# Patient Record
Sex: FEMALE | Race: NATIVE HAWAIIAN OR OTHER PACIFIC ISLANDER | NOT HISPANIC OR LATINO
[De-identification: names, ages, dates, MRNs, and addresses within clinical notes are randomized per-mention and may not be internally consistent; named-entity substitution may affect disease eponyms.]

---

## 2017-12-26 PROBLEM — Z00.00 ENCOUNTER FOR PREVENTIVE HEALTH EXAMINATION: Status: ACTIVE | Noted: 2017-12-26

## 2018-01-18 ENCOUNTER — APPOINTMENT (OUTPATIENT)
Dept: OPHTHALMOLOGY | Facility: CLINIC | Age: 73
End: 2018-01-18
Payer: MEDICARE

## 2018-01-18 PROCEDURE — 92014 COMPRE OPH EXAM EST PT 1/>: CPT

## 2018-02-23 ENCOUNTER — APPOINTMENT (OUTPATIENT)
Dept: OPHTHALMOLOGY | Facility: CLINIC | Age: 73
End: 2018-02-23
Payer: MEDICARE

## 2018-02-23 PROCEDURE — 92012 INTRM OPH EXAM EST PATIENT: CPT

## 2018-03-23 ENCOUNTER — APPOINTMENT (OUTPATIENT)
Dept: OPHTHALMOLOGY | Facility: CLINIC | Age: 73
End: 2018-03-23
Payer: MEDICARE

## 2018-03-23 PROCEDURE — 92012 INTRM OPH EXAM EST PATIENT: CPT

## 2018-03-23 PROCEDURE — 92133 CPTRZD OPH DX IMG PST SGM ON: CPT

## 2018-07-09 ENCOUNTER — APPOINTMENT (OUTPATIENT)
Dept: OPHTHALMOLOGY | Facility: CLINIC | Age: 73
End: 2018-07-09
Payer: MEDICARE

## 2018-07-09 PROCEDURE — 92082 INTERMEDIATE VISUAL FIELD XM: CPT

## 2018-07-09 PROCEDURE — 76514 ECHO EXAM OF EYE THICKNESS: CPT

## 2018-11-05 ENCOUNTER — APPOINTMENT (OUTPATIENT)
Dept: OPHTHALMOLOGY | Facility: CLINIC | Age: 73
End: 2018-11-05
Payer: MEDICARE

## 2018-11-05 DIAGNOSIS — H43.393 OTHER VITREOUS OPACITIES, BILATERAL: ICD-10-CM

## 2018-11-05 DIAGNOSIS — H53.19 OTHER SUBJECTIVE VISUAL DISTURBANCES: ICD-10-CM

## 2018-11-05 PROCEDURE — 92014 COMPRE OPH EXAM EST PT 1/>: CPT

## 2018-11-05 PROCEDURE — 92133 CPTRZD OPH DX IMG PST SGM ON: CPT

## 2019-03-04 ENCOUNTER — APPOINTMENT (OUTPATIENT)
Dept: OPHTHALMOLOGY | Facility: CLINIC | Age: 74
End: 2019-03-04
Payer: MEDICARE

## 2019-03-04 DIAGNOSIS — Z96.1 CATARACT EXTRACTION STATUS, RIGHT EYE: ICD-10-CM

## 2019-03-04 DIAGNOSIS — Z98.41 CATARACT EXTRACTION STATUS, RIGHT EYE: ICD-10-CM

## 2019-03-04 DIAGNOSIS — Z96.1 CATARACT EXTRACTION STATUS, LEFT EYE: ICD-10-CM

## 2019-03-04 DIAGNOSIS — Z96.1 PRESENCE OF INTRAOCULAR LENS: ICD-10-CM

## 2019-03-04 DIAGNOSIS — H40.029: ICD-10-CM

## 2019-03-04 DIAGNOSIS — Z98.42 CATARACT EXTRACTION STATUS, LEFT EYE: ICD-10-CM

## 2019-03-04 PROCEDURE — 92083 EXTENDED VISUAL FIELD XM: CPT

## 2019-03-04 PROCEDURE — 92014 COMPRE OPH EXAM EST PT 1/>: CPT

## 2019-05-21 ENCOUNTER — INPATIENT (INPATIENT)
Facility: HOSPITAL | Age: 74
LOS: 8 days | Discharge: NURSING, REHAB OR LONG-TERM CARE | DRG: 481 | End: 2019-05-30
Attending: ORTHOPAEDIC SURGERY | Admitting: ORTHOPAEDIC SURGERY
Payer: COMMERCIAL

## 2019-05-21 VITALS
HEIGHT: 67 IN | RESPIRATION RATE: 18 BRPM | HEART RATE: 78 BPM | SYSTOLIC BLOOD PRESSURE: 137 MMHG | DIASTOLIC BLOOD PRESSURE: 78 MMHG | OXYGEN SATURATION: 100 % | TEMPERATURE: 98 F | WEIGHT: 175.05 LBS

## 2019-05-21 LAB
ALBUMIN SERPL ELPH-MCNC: 3.7 G/DL — SIGNIFICANT CHANGE UP (ref 3.3–5)
ALP SERPL-CCNC: 74 U/L — SIGNIFICANT CHANGE UP (ref 40–120)
ALT FLD-CCNC: 20 U/L — SIGNIFICANT CHANGE UP (ref 10–45)
ANION GAP SERPL CALC-SCNC: 15 MMOL/L — SIGNIFICANT CHANGE UP (ref 5–17)
APTT BLD: 23.8 SEC — LOW (ref 27.5–36.3)
AST SERPL-CCNC: 28 U/L — SIGNIFICANT CHANGE UP (ref 10–40)
BASOPHILS # BLD AUTO: 0.04 K/UL — SIGNIFICANT CHANGE UP (ref 0–0.2)
BASOPHILS NFR BLD AUTO: 0.2 % — SIGNIFICANT CHANGE UP (ref 0–2)
BILIRUB SERPL-MCNC: 0.8 MG/DL — SIGNIFICANT CHANGE UP (ref 0.2–1.2)
BUN SERPL-MCNC: 18 MG/DL — SIGNIFICANT CHANGE UP (ref 7–23)
CALCIUM SERPL-MCNC: 9 MG/DL — SIGNIFICANT CHANGE UP (ref 8.4–10.5)
CHLORIDE SERPL-SCNC: 100 MMOL/L — SIGNIFICANT CHANGE UP (ref 96–108)
CO2 SERPL-SCNC: 24 MMOL/L — SIGNIFICANT CHANGE UP (ref 22–31)
CREAT SERPL-MCNC: 0.91 MG/DL — SIGNIFICANT CHANGE UP (ref 0.5–1.3)
EOSINOPHIL # BLD AUTO: 0.06 K/UL — SIGNIFICANT CHANGE UP (ref 0–0.5)
EOSINOPHIL NFR BLD AUTO: 0.4 % — SIGNIFICANT CHANGE UP (ref 0–6)
GLUCOSE SERPL-MCNC: 142 MG/DL — HIGH (ref 70–99)
HCT VFR BLD CALC: 38.5 % — SIGNIFICANT CHANGE UP (ref 34.5–45)
HGB BLD-MCNC: 12.9 G/DL — SIGNIFICANT CHANGE UP (ref 11.5–15.5)
IMM GRANULOCYTES NFR BLD AUTO: 0.5 % — SIGNIFICANT CHANGE UP (ref 0–1.5)
INR BLD: 1.06 — SIGNIFICANT CHANGE UP (ref 0.88–1.16)
LYMPHOCYTES # BLD AUTO: 1.27 K/UL — SIGNIFICANT CHANGE UP (ref 1–3.3)
LYMPHOCYTES # BLD AUTO: 7.4 % — LOW (ref 13–44)
MCHC RBC-ENTMCNC: 31.2 PG — SIGNIFICANT CHANGE UP (ref 27–34)
MCHC RBC-ENTMCNC: 33.5 GM/DL — SIGNIFICANT CHANGE UP (ref 32–36)
MCV RBC AUTO: 93 FL — SIGNIFICANT CHANGE UP (ref 80–100)
MONOCYTES # BLD AUTO: 0.84 K/UL — SIGNIFICANT CHANGE UP (ref 0–0.9)
MONOCYTES NFR BLD AUTO: 4.9 % — SIGNIFICANT CHANGE UP (ref 2–14)
NEUTROPHILS # BLD AUTO: 14.79 K/UL — HIGH (ref 1.8–7.4)
NEUTROPHILS NFR BLD AUTO: 86.6 % — HIGH (ref 43–77)
NRBC # BLD: 0 /100 WBCS — SIGNIFICANT CHANGE UP (ref 0–0)
PLATELET # BLD AUTO: 228 K/UL — SIGNIFICANT CHANGE UP (ref 150–400)
POTASSIUM SERPL-MCNC: SIGNIFICANT CHANGE UP MMOL/L (ref 3.5–5.3)
POTASSIUM SERPL-SCNC: SIGNIFICANT CHANGE UP MMOL/L (ref 3.5–5.3)
PROT SERPL-MCNC: 6.5 G/DL — SIGNIFICANT CHANGE UP (ref 6–8.3)
PROTHROM AB SERPL-ACNC: 12 SEC — SIGNIFICANT CHANGE UP (ref 10–12.9)
RBC # BLD: 4.14 M/UL — SIGNIFICANT CHANGE UP (ref 3.8–5.2)
RBC # FLD: 12.8 % — SIGNIFICANT CHANGE UP (ref 10.3–14.5)
SODIUM SERPL-SCNC: 139 MMOL/L — SIGNIFICANT CHANGE UP (ref 135–145)
WBC # BLD: 17.08 K/UL — HIGH (ref 3.8–10.5)
WBC # FLD AUTO: 17.08 K/UL — HIGH (ref 3.8–10.5)

## 2019-05-21 PROCEDURE — 93010 ELECTROCARDIOGRAM REPORT: CPT

## 2019-05-21 PROCEDURE — 73552 X-RAY EXAM OF FEMUR 2/>: CPT | Mod: 26,RT

## 2019-05-21 PROCEDURE — 71045 X-RAY EXAM CHEST 1 VIEW: CPT | Mod: 26

## 2019-05-21 PROCEDURE — 99285 EMERGENCY DEPT VISIT HI MDM: CPT | Mod: 25

## 2019-05-21 PROCEDURE — 72192 CT PELVIS W/O DYE: CPT | Mod: 26

## 2019-05-21 PROCEDURE — 73502 X-RAY EXAM HIP UNI 2-3 VIEWS: CPT | Mod: 26,RT

## 2019-05-21 RX ORDER — ASPIRIN/CALCIUM CARB/MAGNESIUM 324 MG
81 TABLET ORAL DAILY
Refills: 0 | Status: DISCONTINUED | OUTPATIENT
Start: 2019-05-21 | End: 2019-05-22

## 2019-05-21 RX ORDER — IBUPROFEN 200 MG
600 TABLET ORAL ONCE
Refills: 0 | Status: COMPLETED | OUTPATIENT
Start: 2019-05-21 | End: 2019-05-21

## 2019-05-21 RX ORDER — TRAZODONE HCL 50 MG
100 TABLET ORAL DAILY
Refills: 0 | Status: DISCONTINUED | OUTPATIENT
Start: 2019-05-21 | End: 2019-05-30

## 2019-05-21 RX ORDER — TRIAMTERENE/HYDROCHLOROTHIAZID 75 MG-50MG
1 TABLET ORAL DAILY
Refills: 0 | Status: DISCONTINUED | OUTPATIENT
Start: 2019-05-21 | End: 2019-05-23

## 2019-05-21 RX ORDER — ATORVASTATIN CALCIUM 80 MG/1
20 TABLET, FILM COATED ORAL AT BEDTIME
Refills: 0 | Status: DISCONTINUED | OUTPATIENT
Start: 2019-05-21 | End: 2019-05-30

## 2019-05-21 RX ORDER — LATANOPROST 0.05 MG/ML
1 SOLUTION/ DROPS OPHTHALMIC; TOPICAL AT BEDTIME
Refills: 0 | Status: DISCONTINUED | OUTPATIENT
Start: 2019-05-21 | End: 2019-05-30

## 2019-05-21 RX ORDER — LISINOPRIL 2.5 MG/1
40 TABLET ORAL DAILY
Refills: 0 | Status: DISCONTINUED | OUTPATIENT
Start: 2019-05-21 | End: 2019-05-30

## 2019-05-21 RX ADMIN — Medication 100 MILLIGRAM(S): at 23:36

## 2019-05-21 RX ADMIN — Medication 600 MILLIGRAM(S): at 19:58
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
## 2019-05-21 NOTE — ED PROVIDER NOTE - PHYSICAL EXAMINATION
RLE - shortened, unable to range R leg d/t pain, 2+DP. +ecchymosis to R medial lower leg  R groin pain

## 2019-05-21 NOTE — H&P ADULT - NSHPPHYSICALEXAM_GEN_ALL_CORE
Vital Signs Last 24 Hrs  T(C): 36.6 (21 May 2019 16:52), Max: 36.6 (21 May 2019 16:52)  T(F): 97.8 (21 May 2019 16:52), Max: 97.8 (21 May 2019 16:52)  HR: 78 (21 May 2019 16:52) (78 - 78)  BP: 137/78 (21 May 2019 16:52) (137/78 - 137/78)  BP(mean): --  RR: 18 (21 May 2019 16:52) (18 - 18)  SpO2: 100% (21 May 2019 16:52) (100% - 100%)    VSS  General: Pleasant appearing elderly female; NAD; A&Ox3  RLE: Extremity shortened and externally rotated; No skin breaks over the extremity; Significant TTP over the groin area; Extremity WWP, DP/PT pulses 2+cap refill <2 sec; SILT throughout the extremity; 5/5 power TA/GS/EHL/FHL
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
## 2019-05-21 NOTE — H&P ADULT - NSHPLABSRESULTS_GEN_ALL_CORE
Labs:                        12.9   17.08 )-----------( 228      ( 21 May 2019 21:25 )             38.5       05-21    139  |  100  |  18  ----------------------------<  142<H>  SEE NOTE   |  24  |  0.91    Ca    9.0      21 May 2019 21:25    TPro  6.5  /  Alb  3.7  /  TBili  0.8  /  DBili  x   /  AST  28  /  ALT  20  /  AlkPhos  74  05-21          PT/INR - ( 21 May 2019 21:25 )   PT: 12.0 sec;   INR: 1.06          PTT - ( 21 May 2019 21:25 )  PTT:23.8 sec    Pelvis CT shows severely shortened R femur intertroch fx  R hip/R femur XRs pending
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
## 2019-05-21 NOTE — H&P ADULT - NSHPSOCIALHISTORY_GEN_ALL_CORE
Lives alone  3 steps to get to apartment door  Independent in ADLs  Walks on average 5000 steps/day  Drinks 3-4 days/week w 1-2 drinks at a time  Non-smoker
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
## 2019-05-21 NOTE — ED ADULT NURSE NOTE - CHPI ED NUR SYMPTOMS NEG
no fever/no back pain/no tingling/no weakness/no bruising/no numbness/no stiffness/no abrasion/no deformity
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
## 2019-05-22 DIAGNOSIS — S72.001A FRACTURE OF UNSPECIFIED PART OF NECK OF RIGHT FEMUR, INITIAL ENCOUNTER FOR CLOSED FRACTURE: ICD-10-CM

## 2019-05-22 DIAGNOSIS — I10 ESSENTIAL (PRIMARY) HYPERTENSION: ICD-10-CM
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
## 2019-05-22 DIAGNOSIS — E78.00 PURE HYPERCHOLESTEROLEMIA, UNSPECIFIED: ICD-10-CM

## 2019-05-22 DIAGNOSIS — Z01.818 ENCOUNTER FOR OTHER PREPROCEDURAL EXAMINATION: ICD-10-CM

## 2019-05-22 DIAGNOSIS — D72.828 OTHER ELEVATED WHITE BLOOD CELL COUNT: ICD-10-CM

## 2019-05-22 LAB
ANION GAP SERPL CALC-SCNC: 10 MMOL/L — SIGNIFICANT CHANGE UP (ref 5–17)
APPEARANCE UR: CLEAR — SIGNIFICANT CHANGE UP
BILIRUB UR-MCNC: NEGATIVE — SIGNIFICANT CHANGE UP
BLD GP AB SCN SERPL QL: NEGATIVE — SIGNIFICANT CHANGE UP
BLD GP AB SCN SERPL QL: NEGATIVE — SIGNIFICANT CHANGE UP
BUN SERPL-MCNC: 22 MG/DL — SIGNIFICANT CHANGE UP (ref 7–23)
CALCIUM SERPL-MCNC: 8.4 MG/DL — SIGNIFICANT CHANGE UP (ref 8.4–10.5)
CHLORIDE SERPL-SCNC: 103 MMOL/L — SIGNIFICANT CHANGE UP (ref 96–108)
CO2 SERPL-SCNC: 28 MMOL/L — SIGNIFICANT CHANGE UP (ref 22–31)
COLOR SPEC: YELLOW — SIGNIFICANT CHANGE UP
CREAT SERPL-MCNC: 1.07 MG/DL — SIGNIFICANT CHANGE UP (ref 0.5–1.3)
DIFF PNL FLD: NEGATIVE — SIGNIFICANT CHANGE UP
GLUCOSE SERPL-MCNC: 127 MG/DL — HIGH (ref 70–99)
GLUCOSE UR QL: NEGATIVE — SIGNIFICANT CHANGE UP
HCT VFR BLD CALC: 35 % — SIGNIFICANT CHANGE UP (ref 34.5–45)
HGB BLD-MCNC: 11.4 G/DL — LOW (ref 11.5–15.5)
KETONES UR-MCNC: ABNORMAL MG/DL
LEUKOCYTE ESTERASE UR-ACNC: NEGATIVE — SIGNIFICANT CHANGE UP
MCHC RBC-ENTMCNC: 30.7 PG — SIGNIFICANT CHANGE UP (ref 27–34)
MCHC RBC-ENTMCNC: 32.6 GM/DL — SIGNIFICANT CHANGE UP (ref 32–36)
MCV RBC AUTO: 94.3 FL — SIGNIFICANT CHANGE UP (ref 80–100)
MRSA PCR RESULT.: NEGATIVE — SIGNIFICANT CHANGE UP
NITRITE UR-MCNC: NEGATIVE — SIGNIFICANT CHANGE UP
NRBC # BLD: 0 /100 WBCS — SIGNIFICANT CHANGE UP (ref 0–0)
PH UR: 6.5 — SIGNIFICANT CHANGE UP (ref 5–8)
PLATELET # BLD AUTO: 198 K/UL — SIGNIFICANT CHANGE UP (ref 150–400)
POTASSIUM SERPL-MCNC: 3.6 MMOL/L — SIGNIFICANT CHANGE UP (ref 3.5–5.3)
POTASSIUM SERPL-SCNC: 3.6 MMOL/L — SIGNIFICANT CHANGE UP (ref 3.5–5.3)
PROT UR-MCNC: NEGATIVE MG/DL — SIGNIFICANT CHANGE UP
RBC # BLD: 3.71 M/UL — LOW (ref 3.8–5.2)
RBC # FLD: 12.9 % — SIGNIFICANT CHANGE UP (ref 10.3–14.5)
RH IG SCN BLD-IMP: POSITIVE — SIGNIFICANT CHANGE UP
RH IG SCN BLD-IMP: POSITIVE — SIGNIFICANT CHANGE UP
S AUREUS DNA NOSE QL NAA+PROBE: NEGATIVE — SIGNIFICANT CHANGE UP
SODIUM SERPL-SCNC: 141 MMOL/L — SIGNIFICANT CHANGE UP (ref 135–145)
SP GR SPEC: 1.02 — SIGNIFICANT CHANGE UP (ref 1–1.03)
UROBILINOGEN FLD QL: 1 E.U./DL — SIGNIFICANT CHANGE UP
WBC # BLD: 9.59 K/UL — SIGNIFICANT CHANGE UP (ref 3.8–10.5)
WBC # FLD AUTO: 9.59 K/UL — SIGNIFICANT CHANGE UP (ref 3.8–10.5)

## 2019-05-22 PROCEDURE — 99223 1ST HOSP IP/OBS HIGH 75: CPT | Mod: GC

## 2019-05-22 RX ORDER — ACETAMINOPHEN 500 MG
650 TABLET ORAL EVERY 6 HOURS
Refills: 0 | Status: DISCONTINUED | OUTPATIENT
Start: 2019-05-22 | End: 2019-05-23

## 2019-05-22 RX ORDER — TRAMADOL HYDROCHLORIDE 50 MG/1
25 TABLET ORAL EVERY 4 HOURS
Refills: 0 | Status: DISCONTINUED | OUTPATIENT
Start: 2019-05-22 | End: 2019-05-22

## 2019-05-22 RX ORDER — OXYCODONE HYDROCHLORIDE 5 MG/1
10 TABLET ORAL EVERY 4 HOURS
Refills: 0 | Status: DISCONTINUED | OUTPATIENT
Start: 2019-05-23 | End: 2019-05-29

## 2019-05-22 RX ORDER — FERROUS SULFATE 325(65) MG
325 TABLET ORAL
Refills: 0 | Status: DISCONTINUED | OUTPATIENT
Start: 2019-05-23 | End: 2019-05-30

## 2019-05-22 RX ORDER — ASPIRIN/CALCIUM CARB/MAGNESIUM 324 MG
325 TABLET ORAL
Refills: 0 | Status: DISCONTINUED | OUTPATIENT
Start: 2019-05-23 | End: 2019-05-30

## 2019-05-22 RX ORDER — ASCORBIC ACID 60 MG
500 TABLET,CHEWABLE ORAL
Refills: 0 | Status: DISCONTINUED | OUTPATIENT
Start: 2019-05-23 | End: 2019-05-30

## 2019-05-22 RX ORDER — ONDANSETRON 8 MG/1
4 TABLET, FILM COATED ORAL EVERY 6 HOURS
Refills: 0 | Status: DISCONTINUED | OUTPATIENT
Start: 2019-05-23 | End: 2019-05-30

## 2019-05-22 RX ORDER — MORPHINE SULFATE 50 MG/1
2 CAPSULE, EXTENDED RELEASE ORAL EVERY 4 HOURS
Refills: 0 | Status: DISCONTINUED | OUTPATIENT
Start: 2019-05-23 | End: 2019-05-23

## 2019-05-22 RX ORDER — TRAMADOL HYDROCHLORIDE 50 MG/1
50 TABLET ORAL EVERY 4 HOURS
Refills: 0 | Status: DISCONTINUED | OUTPATIENT
Start: 2019-05-22 | End: 2019-05-29

## 2019-05-22 RX ORDER — DOCUSATE SODIUM 100 MG
100 CAPSULE ORAL THREE TIMES A DAY
Refills: 0 | Status: DISCONTINUED | OUTPATIENT
Start: 2019-05-23 | End: 2019-05-30

## 2019-05-22 RX ORDER — SODIUM CHLORIDE 9 MG/ML
1000 INJECTION, SOLUTION INTRAVENOUS
Refills: 0 | Status: DISCONTINUED | OUTPATIENT
Start: 2019-05-22 | End: 2019-05-23

## 2019-05-22 RX ORDER — FOLIC ACID 0.8 MG
1 TABLET ORAL DAILY
Refills: 0 | Status: DISCONTINUED | OUTPATIENT
Start: 2019-05-23 | End: 2019-05-30

## 2019-05-22 RX ADMIN — TRAMADOL HYDROCHLORIDE 50 MILLIGRAM(S): 50 TABLET ORAL at 19:20

## 2019-05-22 RX ADMIN — TRAMADOL HYDROCHLORIDE 50 MILLIGRAM(S): 50 TABLET ORAL at 18:27

## 2019-05-22 RX ADMIN — TRAMADOL HYDROCHLORIDE 25 MILLIGRAM(S): 50 TABLET ORAL at 00:44

## 2019-05-22 NOTE — PROGRESS NOTE ADULT - SUBJECTIVE AND OBJECTIVE BOX
ORTHO NOTE    [x ] Pt seen/examined at 9am  [ ] Pt without any complaints/in NAD.    [x ] Pt complains of: right hip pain with movement      ROS: [ ] Fever  [ ] Chills  [ ] CP [ ] SOB [ ] Dysnea  [ ] Palpitations [ ] Cough [ ] N/V/C/D [ ] Paresthia [ ] Other     [x ] ROS  otherwise negative    .    PHYSICAL EXAM:    Vital Signs Last 24 Hrs  T(C): 36.3 (22 May 2019 08:42), Max: 36.7 (21 May 2019 23:30)  T(F): 97.3 (22 May 2019 08:42), Max: 98 (21 May 2019 23:30)  HR: 78 (22 May 2019 08:42) (70 - 78)  BP: 101/60 (22 May 2019 08:42) (101/60 - 137/78)  BP(mean): --  RR: 15 (22 May 2019 08:42) (15 - 18)  SpO2: 98% (22 May 2019 08:42) (95% - 100%)    I&O's Detail    22 May 2019 07:01  -  22 May 2019 15:23  --------------------------------------------------------  IN:  Total IN: 0 mL    OUT:    Indwelling Catheter - Urethral: 300 mL  Total OUT: 300 mL    Total NET: -300 mL           CAPILLARY BLOOD GLUCOSE                      Neuro: AAOX3    Lungs: nonlabored, Spo2 wnl on RA    CV:    ABD: soft, nontender    Ext:  right lower extremity skin cdi with no noted ecchymosis or abrasions  right LE shortened and externally rotated compared with contralateral extremity  right LE sensation: SILT  right LE vascular: pedal pulse 2+, warm  right LE motor: wiggling toes, unable to perform further mobility secondary to pain    LABS                        11.4   9.59  )-----------( 198      ( 22 May 2019 06:27 )             35.0                              PT/INR - ( 21 May 2019 21:25 )   PT: 12.0 sec;   INR: 1.06          PTT - ( 21 May 2019 21:25 )  PTT:23.8 sec  05-22    141  |  103  |  22  ----------------------------<  127<H>  3.6   |  28  |  1.07    Ca    8.4      22 May 2019 06:27    TPro  6.5  /  Alb  3.7  /  TBili  0.8  /  DBili  x   /  AST  28  /  ALT  20  /  AlkPhos  74  05-21      [ ] Other Labs  [ ] None ordered            Please check or [place] when present:  •  Heart Failure:    [ ] Acute        [ ]  Acute on Chronic        [ ] Chronic         [ ] Diastolic     [ ]  Combined    •  RIAZ:     [ ] ATN        [ ]  Renal medullary necrosis       [ ]  Renal cortical necrosis                  [ ] Other pathological Lesion:  •  CKD:  [ ] Stage I   [ ] Stage II  [ ] Stage III    [ ]Stage IV   [ ]  CKD V   [ ]  Other/Unspecified:    •  Abdominal Nutritional Status:   [ ] Malnutrition-See Nutrition note    [ ] Cachexia   [ ]  Other        [ ] Supplement ordered:            [ ] Morbid Obesity: BMI >=40         ASSESSMENT/PLAN:      STATUS POST: right IT fracture for OR on 5/22/19 for right femur IMN  medically cleared by Dr. Nicolas  NPO on LR  IS demonstrated  PCP Dr. Betsy Matos to be notified of injury  continue urinary pizarro for immobilization  CONTINUE:          [ ] PT- bedrest    [ ] DVT PPX- scd    [ ] Pain Mgt- po meds    [ ] Dispo plan- pending PT evaluation

## 2019-05-22 NOTE — CONSULT NOTE ADULT - PROBLEM SELECTOR RECOMMENDATION 5
The patient's medical condition is optimized for surgery.  There is no contraindication for surgery.  There is no clinical evidence neither of angina, decompensated CHF, arrhthymias, nor valvular disease.   There is no limitation of exercise capacity.  MET is 5 .  ASA class is 2.  [place] cardiac risk factor is low .  DVT prophylaxis is indicated.  Pain control.  Early mobilization.  Avoid fluid overload.

## 2019-05-22 NOTE — PROGRESS NOTE ADULT - SUBJECTIVE AND OBJECTIVE BOX
Ortho Note    Pt comfortable without complaints, pain controlled  Denies CP, SOB, N/V, numbness/tingling     Vital Signs Last 24 Hrs  T(C): 36.3 (22 May 2019 08:42), Max: 36.7 (21 May 2019 23:30)  T(F): 97.3 (22 May 2019 08:42), Max: 98 (21 May 2019 23:30)  HR: 78 (22 May 2019 08:42) (70 - 78)  BP: 101/60 (22 May 2019 08:42) (101/60 - 137/78)  BP(mean): --  RR: 15 (22 May 2019 08:42) (15 - 18)  SpO2: 98% (22 May 2019 08:42) (95% - 100%)    VSS  General: A&Ox3, NAD  RLE: Extremity shortened and externally rotated; No skin breaks over the extremity; Significant TTP over the groin area; Extremity WWP, DP/PT pulses 2+cap refill <2 sec; SILT throughout the extremity; 5/5 power TA/GS/EHL/FHL                          11.4   9.59  )-----------( 198      ( 22 May 2019 06:27 )             35.0   22 May 2019 06:27    141    |  103    |  22     ----------------------------<  127    3.6     |  28     |  1.07     Ca    8.4        22 May 2019 06:27    TPro  6.5    /  Alb  3.7    /  TBili  0.8    /  DBili  x      /  AST  28     /  ALT  20     /  AlkPhos  74     21 May 2019 21:25
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
## 2019-05-23 ENCOUNTER — TRANSCRIPTION ENCOUNTER (OUTPATIENT)
Age: 74
End: 2019-05-23

## 2019-05-23 DIAGNOSIS — Z98.890 OTHER SPECIFIED POSTPROCEDURAL STATES: ICD-10-CM

## 2019-05-23 DIAGNOSIS — C50.912 MALIGNANT NEOPLASM OF UNSPECIFIED SITE OF LEFT FEMALE BREAST: ICD-10-CM

## 2019-05-23 LAB
ANION GAP SERPL CALC-SCNC: 9 MMOL/L — SIGNIFICANT CHANGE UP (ref 5–17)
BUN SERPL-MCNC: 22 MG/DL — SIGNIFICANT CHANGE UP (ref 7–23)
CALCIUM SERPL-MCNC: 8.2 MG/DL — LOW (ref 8.4–10.5)
CHLORIDE SERPL-SCNC: 102 MMOL/L — SIGNIFICANT CHANGE UP (ref 96–108)
CO2 SERPL-SCNC: 27 MMOL/L — SIGNIFICANT CHANGE UP (ref 22–31)
CREAT SERPL-MCNC: 1.05 MG/DL — SIGNIFICANT CHANGE UP (ref 0.5–1.3)
GLUCOSE SERPL-MCNC: 176 MG/DL — HIGH (ref 70–99)
HCT VFR BLD CALC: 25 % — LOW (ref 34.5–45)
HGB BLD-MCNC: 8.4 G/DL — LOW (ref 11.5–15.5)
MCHC RBC-ENTMCNC: 31.8 PG — SIGNIFICANT CHANGE UP (ref 27–34)
MCHC RBC-ENTMCNC: 33.6 GM/DL — SIGNIFICANT CHANGE UP (ref 32–36)
MCV RBC AUTO: 94.7 FL — SIGNIFICANT CHANGE UP (ref 80–100)
NRBC # BLD: 0 /100 WBCS — SIGNIFICANT CHANGE UP (ref 0–0)
PLATELET # BLD AUTO: 188 K/UL — SIGNIFICANT CHANGE UP (ref 150–400)
POTASSIUM SERPL-MCNC: 4.3 MMOL/L — SIGNIFICANT CHANGE UP (ref 3.5–5.3)
POTASSIUM SERPL-SCNC: 4.3 MMOL/L — SIGNIFICANT CHANGE UP (ref 3.5–5.3)
RBC # BLD: 2.64 M/UL — LOW (ref 3.8–5.2)
RBC # FLD: 12.8 % — SIGNIFICANT CHANGE UP (ref 10.3–14.5)
SODIUM SERPL-SCNC: 138 MMOL/L — SIGNIFICANT CHANGE UP (ref 135–145)
WBC # BLD: 15.7 K/UL — HIGH (ref 3.8–10.5)
WBC # FLD AUTO: 15.7 K/UL — HIGH (ref 3.8–10.5)

## 2019-05-23 PROCEDURE — 73552 X-RAY EXAM OF FEMUR 2/>: CPT | Mod: 26,RT

## 2019-05-23 PROCEDURE — 99232 SBSQ HOSP IP/OBS MODERATE 35: CPT | Mod: GC

## 2019-05-23 RX ORDER — ACETAMINOPHEN 500 MG
650 TABLET ORAL EVERY 6 HOURS
Refills: 0 | Status: DISCONTINUED | OUTPATIENT
Start: 2019-05-23 | End: 2019-05-23

## 2019-05-23 RX ORDER — ASPIRIN/CALCIUM CARB/MAGNESIUM 324 MG
1 TABLET ORAL
Qty: 0 | Refills: 0 | DISCHARGE
Start: 2019-05-23

## 2019-05-23 RX ORDER — MORPHINE SULFATE 50 MG/1
2 CAPSULE, EXTENDED RELEASE ORAL
Refills: 0 | Status: DISCONTINUED | OUTPATIENT
Start: 2019-05-23 | End: 2019-05-23

## 2019-05-23 RX ORDER — CEFAZOLIN SODIUM 1 G
2000 VIAL (EA) INJECTION EVERY 8 HOURS
Refills: 0 | Status: COMPLETED | OUTPATIENT
Start: 2019-05-23 | End: 2019-05-23

## 2019-05-23 RX ORDER — DOCUSATE SODIUM 100 MG
1 CAPSULE ORAL
Qty: 0 | Refills: 0 | DISCHARGE
Start: 2019-05-23

## 2019-05-23 RX ORDER — CEFAZOLIN SODIUM 1 G
2000 VIAL (EA) INJECTION EVERY 8 HOURS
Refills: 0 | Status: DISCONTINUED | OUTPATIENT
Start: 2019-05-23 | End: 2019-05-23

## 2019-05-23 RX ORDER — MAGNESIUM HYDROXIDE 400 MG/1
30 TABLET, CHEWABLE ORAL DAILY
Refills: 0 | Status: DISCONTINUED | OUTPATIENT
Start: 2019-05-23 | End: 2019-05-23

## 2019-05-23 RX ORDER — ACETAMINOPHEN 500 MG
975 TABLET ORAL EVERY 8 HOURS
Refills: 0 | Status: DISCONTINUED | OUTPATIENT
Start: 2019-05-23 | End: 2019-05-30

## 2019-05-23 RX ORDER — SODIUM CHLORIDE 9 MG/ML
1000 INJECTION, SOLUTION INTRAVENOUS
Refills: 0 | Status: DISCONTINUED | OUTPATIENT
Start: 2019-05-23 | End: 2019-05-23

## 2019-05-23 RX ADMIN — Medication 100 MILLIGRAM(S): at 13:43

## 2019-05-23 RX ADMIN — Medication 100 MILLIGRAM(S): at 05:27

## 2019-05-23 RX ADMIN — Medication 325 MILLIGRAM(S): at 17:47

## 2019-05-23 RX ADMIN — SODIUM CHLORIDE 120 MILLILITER(S): 9 INJECTION, SOLUTION INTRAVENOUS at 00:15

## 2019-05-23 RX ADMIN — ATORVASTATIN CALCIUM 20 MILLIGRAM(S): 80 TABLET, FILM COATED ORAL at 21:41

## 2019-05-23 RX ADMIN — Medication 325 MILLIGRAM(S): at 05:27

## 2019-05-23 RX ADMIN — Medication 2000 MILLIGRAM(S): at 04:33

## 2019-05-23 RX ADMIN — Medication 975 MILLIGRAM(S): at 21:41

## 2019-05-23 RX ADMIN — Medication 1 TABLET(S): at 13:43

## 2019-05-23 RX ADMIN — Medication 975 MILLIGRAM(S): at 22:41

## 2019-05-23 RX ADMIN — Medication 500 MILLIGRAM(S): at 05:27

## 2019-05-23 RX ADMIN — Medication 1 MILLIGRAM(S): at 13:43

## 2019-05-23 RX ADMIN — Medication 2000 MILLIGRAM(S): at 13:43

## 2019-05-23 RX ADMIN — Medication 975 MILLIGRAM(S): at 14:40

## 2019-05-23 RX ADMIN — Medication 100 MILLIGRAM(S): at 21:41

## 2019-05-23 RX ADMIN — Medication 975 MILLIGRAM(S): at 13:42

## 2019-05-23 RX ADMIN — Medication 325 MILLIGRAM(S): at 08:07

## 2019-05-23 RX ADMIN — Medication 500 MILLIGRAM(S): at 17:47

## 2019-05-23 RX ADMIN — LATANOPROST 1 DROP(S): 0.05 SOLUTION/ DROPS OPHTHALMIC; TOPICAL at 21:42

## 2019-05-23 NOTE — DISCHARGE NOTE PROVIDER - NSDCACTIVITY_GEN_ALL_CORE
Do not make important decisions/Do not drive or operate machinery/Showering allowed No heavy lifting/straining/Showering allowed/Do not drive or operate machinery/Walking - Indoors allowed/Stairs allowed
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
## 2019-05-23 NOTE — PHYSICAL THERAPY INITIAL EVALUATION ADULT - ADDITIONAL COMMENTS
Pt lives alone in an apartment, 3 steps walk up. Prior to admission, pt ambulated independently without assistive device. Pt has a cane at home.

## 2019-05-23 NOTE — OCCUPATIONAL THERAPY INITIAL EVALUATION ADULT - GENERAL OBSERVATIONS, REHAB EVAL
Right hand dominant. Chart reviewed, patient cleared for OT eval by KIRTI Rose. Received semi-supine, NAD, +SCDs.
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
## 2019-05-23 NOTE — PROGRESS NOTE ADULT - SUBJECTIVE AND OBJECTIVE BOX
ORTHO NOTE    [x ] Pt seen/examined at 10am  [ ] Pt without any complaints/in NAD.    [x ] Pt complains of: mild right femur pain with mobility tolerable on oral pain regimen      ROS: [ ] Fever  [ ] Chills  [ ] CP [ ] SOB [ ] Dysnea  [ ] Palpitations [ ] Cough [ ] N/V/C/D [ ] Paresthia [ ] Other     [x ] ROS  otherwise negative    .    PHYSICAL EXAM:    Vital Signs Last 24 Hrs  T(C): 36.3 (23 May 2019 08:40), Max: 36.4 (23 May 2019 04:53)  T(F): 97.3 (23 May 2019 08:40), Max: 97.6 (23 May 2019 04:53)  HR: 86 (23 May 2019 08:40) (80 - 104)  BP: 105/68 (23 May 2019 08:40) (100/66 - 155/71)  BP(mean): 66 (23 May 2019 01:45) (66 - 104)  RR: 15 (23 May 2019 08:40) (12 - 19)  SpO2: 98% (23 May 2019 08:40) (96% - 100%)    I&O's Detail    22 May 2019 07:01  -  23 May 2019 07:00  --------------------------------------------------------  IN:    lactated ringers.: 600 mL    lactated ringers.: 360 mL  Total IN: 960 mL    OUT:    Indwelling Catheter - Urethral: 755 mL  Total OUT: 755 mL    Total NET: 205 mL      23 May 2019 07:01  -  23 May 2019 14:44  --------------------------------------------------------  IN:    Oral Fluid: 240 mL  Total IN: 240 mL    OUT:  Total OUT: 0 mL    Total NET: 240 mL           CAPILLARY BLOOD GLUCOSE                      Neuro: AAOX3    Lungs: nonlabored, Spo2 wnl on RA, IS demonstrated    CV:    ABD: soft, nontender    Ext:  right femur aquacel dressing cdi  right LE sensation: silt  right LE vascular: warm, pedal pulse 2+  right LE motor: 5/5 ehl/fhl/gs/ta    LABS                        8.4    15.70 )-----------( 188      ( 23 May 2019 10:20 )             25.0                              PT/INR - ( 21 May 2019 21:25 )   PT: 12.0 sec;   INR: 1.06          PTT - ( 21 May 2019 21:25 )  PTT:23.8 sec  05-23    138  |  102  |  22  ----------------------------<  176<H>  4.3   |  27  |  1.05    Ca    8.2<L>      23 May 2019 10:20    TPro  6.5  /  Alb  3.7  /  TBili  0.8  /  DBili  x   /  AST  28  /  ALT  20  /  AlkPhos  74  05-21      [ ] Other Labs  [ ] None ordered            Please check or [place] when present:  •  Heart Failure:    [ ] Acute        [ ]  Acute on Chronic        [ ] Chronic         [ ] Diastolic     [ ]  Combined    •  RIAZ:     [ ] ATN        [ ]  Renal medullary necrosis       [ ]  Renal cortical necrosis                  [ ] Other pathological Lesion:  •  CKD:  [ ] Stage I   [ ] Stage II  [ ] Stage III    [ ]Stage IV   [ ]  CKD V   [ ]  Other/Unspecified:    •  Abdominal Nutritional Status:   [ ] Malnutrition-See Nutrition note    [ ] Cachexia   [ ]  Other        [ ] Supplement ordered:            [ ] Morbid Obesity: BMI >=40         ASSESSMENT/PLAN:      STATUS POST: pod1 right femur IM nailing  patient arrived to the 8th floor at approximately 2am.  IVF continued until approximately 1pm.  naz pizarro TOV  patient dangled on side of bed with min assistance  pain control- acetaminophen 975mg q 8 standing pain, oxycodone 5-10mg q 4 prn  bowel regimen  CONTINUE:          [ ] PT- wbat    [ ] DVT PPX- scd, ASA    [ ] Pain Mgt- po meds    [ ] Dispo plan- pending PT evaluation, lives alone likely [facility] ORTHO NOTE    [x ] Pt seen/examined at 10am  [ ] Pt without any complaints/in NAD.    [x ] Pt complains of: mild right femur pain with mobility tolerable on oral pain regimen      ROS: [ ] Fever  [ ] Chills  [ ] CP [ ] SOB [ ] Dysnea  [ ] Palpitations [ ] Cough [ ] N/V/C/D [ ] Paresthia [ ] Other     [x ] ROS  otherwise negative    .    PHYSICAL EXAM:    Vital Signs Last 24 Hrs  T(C): 36.3 (23 May 2019 08:40), Max: 36.4 (23 May 2019 04:53)  T(F): 97.3 (23 May 2019 08:40), Max: 97.6 (23 May 2019 04:53)  HR: 86 (23 May 2019 08:40) (80 - 104)  BP: 105/68 (23 May 2019 08:40) (100/66 - 155/71)  BP(mean): 66 (23 May 2019 01:45) (66 - 104)  RR: 15 (23 May 2019 08:40) (12 - 19)  SpO2: 98% (23 May 2019 08:40) (96% - 100%)    I&O's Detail    22 May 2019 07:01  -  23 May 2019 07:00  --------------------------------------------------------  IN:    lactated ringers.: 600 mL    lactated ringers.: 360 mL  Total IN: 960 mL    OUT:    Indwelling Catheter - Urethral: 755 mL  Total OUT: 755 mL    Total NET: 205 mL      23 May 2019 07:01  -  23 May 2019 14:44  --------------------------------------------------------  IN:    Oral Fluid: 240 mL  Total IN: 240 mL    OUT:  Total OUT: 0 mL    Total NET: 240 mL           CAPILLARY BLOOD GLUCOSE                      Neuro: AAOX3    Lungs: nonlabored, Spo2 wnl on RA, IS demonstrated    CV:    ABD: soft, nontender    Ext:  right femur aquacel dressing cdi  right LE sensation: silt  right LE vascular: warm, pedal pulse 2+  right LE motor: 5/5 ehl/fhl/gs/ta    LABS                        8.4    15.70 )-----------( 188      ( 23 May 2019 10:20 )             25.0                              PT/INR - ( 21 May 2019 21:25 )   PT: 12.0 sec;   INR: 1.06          PTT - ( 21 May 2019 21:25 )  PTT:23.8 sec  05-23    138  |  102  |  22  ----------------------------<  176<H>  4.3   |  27  |  1.05    Ca    8.2<L>      23 May 2019 10:20    TPro  6.5  /  Alb  3.7  /  TBili  0.8  /  DBili  x   /  AST  28  /  ALT  20  /  AlkPhos  74  05-21      [ ] Other Labs  [ ] None ordered            Please check or [place] when present:  •  Heart Failure:    [ ] Acute        [ ]  Acute on Chronic        [ ] Chronic         [ ] Diastolic     [ ]  Combined    •  RIAZ:     [ ] ATN        [ ]  Renal medullary necrosis       [ ]  Renal cortical necrosis                  [ ] Other pathological Lesion:  •  CKD:  [ ] Stage I   [ ] Stage II  [ ] Stage III    [ ]Stage IV   [ ]  CKD V   [ ]  Other/Unspecified:    •  Abdominal Nutritional Status:   [ ] Malnutrition-See Nutrition note    [ ] Cachexia   [ ]  Other        [ ] Supplement ordered:            [ ] Morbid Obesity: BMI >=40         ASSESSMENT/PLAN:      STATUS POST: pod1 right femur IM nailing  patient arrived to the 8th floor at approximately 2am.  IVF continued until approximately 1pm.  h/h 8.4/25.  trend cbc anemia likely secondary to fracture and dilutional.  dc juarez TOV  patient dangled on side of bed with min assistance  pain control- acetaminophen 975mg q 8 standing pain, oxycodone 5-10mg q 4 prn  bowel regimen  CONTINUE:          [ ] PT- wbat    [ ] DVT PPX- scd, ASA    [ ] Pain Mgt- po meds    [ ] Dispo plan- pending PT evaluation, lives alone likely [facility]
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
## 2019-05-23 NOTE — PHYSICAL THERAPY INITIAL EVALUATION ADULT - GAIT DEVIATIONS NOTED, PT EVAL
decreased nany/decreased step length/Slightly unsteady gait, no lose of balance, decreased heel strike and hip flexion on RLE during ambulation./increased time in double stance

## 2019-05-23 NOTE — PHYSICAL THERAPY INITIAL EVALUATION ADULT - CRITERIA FOR SKILLED THERAPEUTIC INTERVENTIONS
therapy frequency/anticipated discharge recommendation/functional limitations in following categories/rehab potential/impairments found

## 2019-05-23 NOTE — PROGRESS NOTE ADULT - SUBJECTIVE AND OBJECTIVE BOX
Ortho Post Op Check    Procedure: R IMN  Surgeon: Thomas    Pt comfortable without complaints, pain controlled  Denies CP, SOB, N/V, numbness/tingling     Vital Signs Last 24 Hrs  T(C): 36.3 (05-23-19 @ 02:16), Max: 36.3 (05-22-19 @ 23:55)  T(F): 97.3 (05-23-19 @ 02:16), Max: 97.3 (05-22-19 @ 23:55)  HR: 101 (05-23-19 @ 02:16) (96 - 104)  BP: 100/66 (05-23-19 @ 02:16) (100/66 - 155/71)  BP(mean): 66 (05-23-19 @ 01:45) (66 - 104)  RR: 16 (05-23-19 @ 02:16) (12 - 19)  SpO2: 97% (05-23-19 @ 02:16) (96% - 100%)    General: Pt Alert and oriented, NAD  R femur aquacell DSG C/D/I  Pulses: 2+ DP/PT  Sensation: s/s/sp/dp/t intact  Motor: EHL/FHL/TA/GS 5/5                          11.4   9.59  )-----------( 198      ( 22 May 2019 06:27 )             35.0   22 May 2019 06:27    141    |  103    |  22     ----------------------------<  127    3.6     |  28     |  1.07       TPro  6.5    /  Alb  3.7    /  TBili  0.8    /  DBili  x      /  AST  28     /  ALT  20     /  AlkPhos  74     21 May 2019 21:25      Post-op X-Ray: intraop fluoro    A/P: 74yFemale POD#0 s/p R IMN  - Stable  - Pain Control  - DVT ppx: ASA, SCDs  - Post op abx: ancef periop  - PT, WBS: WBAT  - f/u AM albs  - dispo pending PT eval    Ortho Pager [phone]

## 2019-05-23 NOTE — DISCHARGE NOTE PROVIDER - HOSPITAL COURSE
Admit- right hip intratrochanteric fracture    OR- right femur intramedullary nailing    Periop Antibx    DVT ppx    PT / OT    Pain mgt    medicine consult Admit- right hip intratrochanteric fracture    OR- right femur intramedullary nailing 5/22    Periop Antibx    5/24 1 unit prbc for acute surgical blood loss anemia    DVT ppx    PT / OT    Pain mgt    medicine consult Admit- right hip intratrochanteric fracture    OR- right femur intramedullary nailing 5/22/19    Periop Antibx    5/24/19 1 unit prbc for acute surgical blood loss anemia    DVT ppx    PT / OT    Pain mgt    medicine consult

## 2019-05-23 NOTE — PHYSICAL THERAPY INITIAL EVALUATION ADULT - BED MOBILITY LIMITATIONS, REHAB EVAL
Problem: Functional Cognition  Goal: STG-Within one week, patient will demonstrate safety awareness  Goal: STG-Within one week, patient will demonstrate safety awareness  1) Individualized Goal: with fair knowledge by demonstrating locking w/c consistently during therapy session.  2) Interventions: OT Orthotics Training, OT E Stim Attended, OT Self Care/ADL, OT Cognitive Skill Dev, OT Community Reintegration, OT Manual Ther Technique, OT Neuro Re-Ed/Balance, OT Sensory Int Techniques, OT Therapeutic Activity, OT Evaluation and OT Therapeutic Exercise  Outcome: NOT MET         impaired ability to control trunk for mobility/decreased ability to use arms for pushing/pulling/decreased ability to use legs for bridging/pushing

## 2019-05-23 NOTE — PROGRESS NOTE ADULT - SUBJECTIVE AND OBJECTIVE BOX
S: Patient seen and examined. Pt. doing well, Pain endorsed but controlled. No acute events overnight.    Vital Signs Last 24 Hrs  T(C): 36.4 (23 May 2019 04:53), Max: 36.4 (23 May 2019 04:53)  T(F): 97.6 (23 May 2019 04:53), Max: 97.6 (23 May 2019 04:53)  HR: 93 (23 May 2019 04:53) (78 - 104)  BP: 104/66 (23 May 2019 04:53) (100/66 - 155/71)  BP(mean): 66 (23 May 2019 01:45) (66 - 104)  RR: 17 (23 May 2019 04:53) (12 - 19)  SpO2: 98% (23 May 2019 04:53) (96% - 100%)    NAD, AOx3, comfortable  Motor: 5/5 GS/TA/EHL/FHL    Sensation: SILT   Pulses: WWP, DP/PT 2+    Dressing: C/D/I                          11.4   9.59  )-----------( 198      ( 22 May 2019 06:27 )             35.0         A/P:  74y Female s/p R hip IMN on 5/22          -Stable  -Pain Control  -PT/WBAT  -DVT ppx: ASA  -Advance diet as tolerated  -f/u AM labs  -Dispo: pending

## 2019-05-23 NOTE — DISCHARGE NOTE PROVIDER - NSDCCPTREATMENT_GEN_ALL_CORE_FT
PRINCIPAL PROCEDURE  Procedure: Intramedullary nailing of femur  Findings and Treatment: PRINCIPAL PROCEDURE  Procedure: Intramedullary nailing of femur  Findings and Treatment: Right IT fx

## 2019-05-23 NOTE — OCCUPATIONAL THERAPY INITIAL EVALUATION ADULT - PLANNED THERAPY INTERVENTIONS, OT EVAL
IADL retraining/strengthening/balance training/ROM/transfer training/ADL retraining/bed mobility training
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
## 2019-05-24 LAB
ANION GAP SERPL CALC-SCNC: 8 MMOL/L — SIGNIFICANT CHANGE UP (ref 5–17)
BUN SERPL-MCNC: 25 MG/DL — HIGH (ref 7–23)
CALCIUM SERPL-MCNC: 8.1 MG/DL — LOW (ref 8.4–10.5)
CHLORIDE SERPL-SCNC: 105 MMOL/L — SIGNIFICANT CHANGE UP (ref 96–108)
CO2 SERPL-SCNC: 29 MMOL/L — SIGNIFICANT CHANGE UP (ref 22–31)
CREAT SERPL-MCNC: 0.84 MG/DL — SIGNIFICANT CHANGE UP (ref 0.5–1.3)
GLUCOSE SERPL-MCNC: 141 MG/DL — HIGH (ref 70–99)
HCT VFR BLD CALC: 20.8 % — CRITICAL LOW (ref 34.5–45)
HGB BLD-MCNC: 6.9 G/DL — CRITICAL LOW (ref 11.5–15.5)
MCHC RBC-ENTMCNC: 31.7 PG — SIGNIFICANT CHANGE UP (ref 27–34)
MCHC RBC-ENTMCNC: 33.2 GM/DL — SIGNIFICANT CHANGE UP (ref 32–36)
MCV RBC AUTO: 95.4 FL — SIGNIFICANT CHANGE UP (ref 80–100)
NRBC # BLD: 0 /100 WBCS — SIGNIFICANT CHANGE UP (ref 0–0)
PLATELET # BLD AUTO: 160 K/UL — SIGNIFICANT CHANGE UP (ref 150–400)
POTASSIUM SERPL-MCNC: 3.6 MMOL/L — SIGNIFICANT CHANGE UP (ref 3.5–5.3)
POTASSIUM SERPL-SCNC: 3.6 MMOL/L — SIGNIFICANT CHANGE UP (ref 3.5–5.3)
RBC # BLD: 2.18 M/UL — LOW (ref 3.8–5.2)
RBC # FLD: 13.1 % — SIGNIFICANT CHANGE UP (ref 10.3–14.5)
SODIUM SERPL-SCNC: 142 MMOL/L — SIGNIFICANT CHANGE UP (ref 135–145)
WBC # BLD: 9.89 K/UL — SIGNIFICANT CHANGE UP (ref 3.8–10.5)
WBC # FLD AUTO: 9.89 K/UL — SIGNIFICANT CHANGE UP (ref 3.8–10.5)

## 2019-05-24 PROCEDURE — 99232 SBSQ HOSP IP/OBS MODERATE 35: CPT | Mod: GC

## 2019-05-24 RX ORDER — SENNA PLUS 8.6 MG/1
2 TABLET ORAL AT BEDTIME
Refills: 0 | Status: DISCONTINUED | OUTPATIENT
Start: 2019-05-24 | End: 2019-05-30

## 2019-05-24 RX ORDER — POLYETHYLENE GLYCOL 3350 17 G/17G
17 POWDER, FOR SOLUTION ORAL DAILY
Refills: 0 | Status: DISCONTINUED | OUTPATIENT
Start: 2019-05-24 | End: 2019-05-30

## 2019-05-24 RX ADMIN — Medication 1 TABLET(S): at 17:24

## 2019-05-24 RX ADMIN — TRAMADOL HYDROCHLORIDE 50 MILLIGRAM(S): 50 TABLET ORAL at 21:35

## 2019-05-24 RX ADMIN — TRAMADOL HYDROCHLORIDE 50 MILLIGRAM(S): 50 TABLET ORAL at 22:34

## 2019-05-24 RX ADMIN — LATANOPROST 1 DROP(S): 0.05 SOLUTION/ DROPS OPHTHALMIC; TOPICAL at 21:33

## 2019-05-24 RX ADMIN — SENNA PLUS 2 TABLET(S): 8.6 TABLET ORAL at 21:33

## 2019-05-24 RX ADMIN — Medication 100 MILLIGRAM(S): at 05:01

## 2019-05-24 RX ADMIN — POLYETHYLENE GLYCOL 3350 17 GRAM(S): 17 POWDER, FOR SOLUTION ORAL at 17:24

## 2019-05-24 RX ADMIN — Medication 1 MILLIGRAM(S): at 17:24

## 2019-05-24 RX ADMIN — Medication 325 MILLIGRAM(S): at 17:24

## 2019-05-24 RX ADMIN — Medication 325 MILLIGRAM(S): at 14:27

## 2019-05-24 RX ADMIN — Medication 975 MILLIGRAM(S): at 05:01

## 2019-05-24 RX ADMIN — Medication 975 MILLIGRAM(S): at 06:01

## 2019-05-24 RX ADMIN — Medication 975 MILLIGRAM(S): at 00:00

## 2019-05-24 RX ADMIN — Medication 500 MILLIGRAM(S): at 17:24

## 2019-05-24 RX ADMIN — ATORVASTATIN CALCIUM 20 MILLIGRAM(S): 80 TABLET, FILM COATED ORAL at 21:33

## 2019-05-24 RX ADMIN — Medication 975 MILLIGRAM(S): at 21:34

## 2019-05-24 RX ADMIN — Medication 5 MILLIGRAM(S): at 17:24

## 2019-05-24 RX ADMIN — Medication 975 MILLIGRAM(S): at 22:34

## 2019-05-24 RX ADMIN — Medication 325 MILLIGRAM(S): at 07:49

## 2019-05-24 RX ADMIN — Medication 100 MILLIGRAM(S): at 14:26

## 2019-05-24 RX ADMIN — Medication 325 MILLIGRAM(S): at 05:01

## 2019-05-24 RX ADMIN — Medication 500 MILLIGRAM(S): at 05:01

## 2019-05-24 RX ADMIN — Medication 100 MILLIGRAM(S): at 21:34

## 2019-05-24 RX ADMIN — TRAMADOL HYDROCHLORIDE 50 MILLIGRAM(S): 50 TABLET ORAL at 17:24

## 2019-05-24 RX ADMIN — Medication 975 MILLIGRAM(S): at 14:27
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
## 2019-05-24 NOTE — PROGRESS NOTE ADULT - SUBJECTIVE AND OBJECTIVE BOX
S: Patient seen and examined. Pt. doing well, Pain endorsed but controlled. No acute events overnight.    Vital Signs Last 24 Hrs  T(C): 36.8 (24 May 2019 04:53), Max: 37.4 (23 May 2019 20:20)  T(F): 98.2 (24 May 2019 04:53), Max: 99.3 (23 May 2019 20:20)  HR: 82 (24 May 2019 04:53) (82 - 117)  BP: 101/61 (24 May 2019 04:53) (90/55 - 113/53)  BP(mean): --  RR: 16 (24 May 2019 04:53) (15 - 16)  SpO2: 98% (24 May 2019 04:53) (97% - 98%)    NAD, AOx3, comfortable  Motor: 5/5 GS/TA/EHL/FHL    Sensation: SILT   Pulses: WWP, DP/PT 2+    Dressing: C/D/I                                      8.4    15.70 )-----------( 188      ( 23 May 2019 10:20 )             25.0            A/P:  74y Female s/p R hip IMN on 5/22          -Stable  -Pain Control  -PT/WBAT  -DVT ppx: ASA  -Advance diet as tolerated  -f/u AM labs  -Dispo: [facility]
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
## 2019-05-24 NOTE — DIETITIAN INITIAL EVALUATION ADULT. - ENERGY NEEDS
Height 67"; #; #; 130%IBW  BMI 27.4  Ideal body weight used for calculations as pt >120% of IBW. Needs increased for post-op/wound healing
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
## 2019-05-25 DIAGNOSIS — D50.0 IRON DEFICIENCY ANEMIA SECONDARY TO BLOOD LOSS (CHRONIC): ICD-10-CM

## 2019-05-25 LAB
ANION GAP SERPL CALC-SCNC: 7 MMOL/L — SIGNIFICANT CHANGE UP (ref 5–17)
BUN SERPL-MCNC: 19 MG/DL — SIGNIFICANT CHANGE UP (ref 7–23)
CALCIUM SERPL-MCNC: 7.9 MG/DL — LOW (ref 8.4–10.5)
CHLORIDE SERPL-SCNC: 104 MMOL/L — SIGNIFICANT CHANGE UP (ref 96–108)
CO2 SERPL-SCNC: 29 MMOL/L — SIGNIFICANT CHANGE UP (ref 22–31)
CREAT SERPL-MCNC: 0.76 MG/DL — SIGNIFICANT CHANGE UP (ref 0.5–1.3)
GLUCOSE SERPL-MCNC: 122 MG/DL — HIGH (ref 70–99)
HCT VFR BLD CALC: 23.8 % — LOW (ref 34.5–45)
HGB BLD-MCNC: 7.9 G/DL — LOW (ref 11.5–15.5)
MCHC RBC-ENTMCNC: 31.2 PG — SIGNIFICANT CHANGE UP (ref 27–34)
MCHC RBC-ENTMCNC: 33.2 GM/DL — SIGNIFICANT CHANGE UP (ref 32–36)
MCV RBC AUTO: 94.1 FL — SIGNIFICANT CHANGE UP (ref 80–100)
NRBC # BLD: 0 /100 WBCS — SIGNIFICANT CHANGE UP (ref 0–0)
PLATELET # BLD AUTO: 171 K/UL — SIGNIFICANT CHANGE UP (ref 150–400)
POTASSIUM SERPL-MCNC: 3.8 MMOL/L — SIGNIFICANT CHANGE UP (ref 3.5–5.3)
POTASSIUM SERPL-SCNC: 3.8 MMOL/L — SIGNIFICANT CHANGE UP (ref 3.5–5.3)
RBC # BLD: 2.53 M/UL — LOW (ref 3.8–5.2)
RBC # FLD: 14.1 % — SIGNIFICANT CHANGE UP (ref 10.3–14.5)
SODIUM SERPL-SCNC: 140 MMOL/L — SIGNIFICANT CHANGE UP (ref 135–145)
WBC # BLD: 10.35 K/UL — SIGNIFICANT CHANGE UP (ref 3.8–10.5)
WBC # FLD AUTO: 10.35 K/UL — SIGNIFICANT CHANGE UP (ref 3.8–10.5)

## 2019-05-25 RX ADMIN — Medication 975 MILLIGRAM(S): at 06:11

## 2019-05-25 RX ADMIN — Medication 100 MILLIGRAM(S): at 21:22

## 2019-05-25 RX ADMIN — Medication 500 MILLIGRAM(S): at 05:11

## 2019-05-25 RX ADMIN — Medication 975 MILLIGRAM(S): at 05:11

## 2019-05-25 RX ADMIN — Medication 325 MILLIGRAM(S): at 05:11

## 2019-05-25 RX ADMIN — Medication 325 MILLIGRAM(S): at 07:32

## 2019-05-25 RX ADMIN — Medication 975 MILLIGRAM(S): at 14:03

## 2019-05-25 RX ADMIN — Medication 500 MILLIGRAM(S): at 18:06

## 2019-05-25 RX ADMIN — LATANOPROST 1 DROP(S): 0.05 SOLUTION/ DROPS OPHTHALMIC; TOPICAL at 21:22

## 2019-05-25 RX ADMIN — POLYETHYLENE GLYCOL 3350 17 GRAM(S): 17 POWDER, FOR SOLUTION ORAL at 12:25

## 2019-05-25 RX ADMIN — Medication 325 MILLIGRAM(S): at 18:06

## 2019-05-25 RX ADMIN — Medication 325 MILLIGRAM(S): at 12:25

## 2019-05-25 RX ADMIN — Medication 1 MILLIGRAM(S): at 12:25

## 2019-05-25 RX ADMIN — ATORVASTATIN CALCIUM 20 MILLIGRAM(S): 80 TABLET, FILM COATED ORAL at 21:23

## 2019-05-25 RX ADMIN — Medication 100 MILLIGRAM(S): at 05:11

## 2019-05-25 RX ADMIN — LISINOPRIL 40 MILLIGRAM(S): 2.5 TABLET ORAL at 21:22

## 2019-05-25 RX ADMIN — Medication 100 MILLIGRAM(S): at 14:02

## 2019-05-25 RX ADMIN — Medication 975 MILLIGRAM(S): at 22:22

## 2019-05-25 RX ADMIN — Medication 1 TABLET(S): at 12:25

## 2019-05-25 RX ADMIN — Medication 975 MILLIGRAM(S): at 21:22

## 2019-05-25 NOTE — PROGRESS NOTE ADULT - SUBJECTIVE AND OBJECTIVE BOX
S: Patient seen and examined. Pt. doing well, Pain endorsed but controlled. No acute events overnight.    Vital Signs Last 24 Hrs  T(C): 36.7 (25 May 2019 08:20), Max: 37.4 (24 May 2019 16:13)  T(F): 98 (25 May 2019 08:20), Max: 99.4 (24 May 2019 16:13)  HR: 86 (25 May 2019 08:20) (85 - 106)  BP: 101/66 (25 May 2019 08:20) (101/66 - 124/56)  BP(mean): --  RR: 15 (25 May 2019 08:20) (15 - 17)  SpO2: 97% (25 May 2019 08:20) (97% - 100%)    NAD, AOx3, comfortable  Motor: 5/5 GS/TA/EHL/FHL    Sensation: SILT   Pulses: WWP, DP/PT 2+    Dressing: C/D/I                                      8.4    15.70 )-----------( 188      ( 23 May 2019 10:20 )             25.0

## 2019-05-26 ENCOUNTER — TRANSCRIPTION ENCOUNTER (OUTPATIENT)
Age: 74
End: 2019-05-26

## 2019-05-26 DIAGNOSIS — E78.5 HYPERLIPIDEMIA, UNSPECIFIED: ICD-10-CM

## 2019-05-26 DIAGNOSIS — I10 ESSENTIAL (PRIMARY) HYPERTENSION: ICD-10-CM

## 2019-05-26 DIAGNOSIS — E87.6 HYPOKALEMIA: ICD-10-CM

## 2019-05-26 LAB
ANION GAP SERPL CALC-SCNC: 12 MMOL/L — SIGNIFICANT CHANGE UP (ref 5–17)
BLD GP AB SCN SERPL QL: NEGATIVE — SIGNIFICANT CHANGE UP
BUN SERPL-MCNC: 15 MG/DL — SIGNIFICANT CHANGE UP (ref 7–23)
CALCIUM SERPL-MCNC: 8.4 MG/DL — SIGNIFICANT CHANGE UP (ref 8.4–10.5)
CHLORIDE SERPL-SCNC: 104 MMOL/L — SIGNIFICANT CHANGE UP (ref 96–108)
CO2 SERPL-SCNC: 25 MMOL/L — SIGNIFICANT CHANGE UP (ref 22–31)
CREAT SERPL-MCNC: 0.71 MG/DL — SIGNIFICANT CHANGE UP (ref 0.5–1.3)
GLUCOSE SERPL-MCNC: 123 MG/DL — HIGH (ref 70–99)
HCT VFR BLD CALC: 28 % — LOW (ref 34.5–45)
HGB BLD-MCNC: 9 G/DL — LOW (ref 11.5–15.5)
MCHC RBC-ENTMCNC: 30.9 PG — SIGNIFICANT CHANGE UP (ref 27–34)
MCHC RBC-ENTMCNC: 32.1 GM/DL — SIGNIFICANT CHANGE UP (ref 32–36)
MCV RBC AUTO: 96.2 FL — SIGNIFICANT CHANGE UP (ref 80–100)
NRBC # BLD: 0 /100 WBCS — SIGNIFICANT CHANGE UP (ref 0–0)
PLATELET # BLD AUTO: 244 K/UL — SIGNIFICANT CHANGE UP (ref 150–400)
POTASSIUM SERPL-MCNC: 3.2 MMOL/L — LOW (ref 3.5–5.3)
POTASSIUM SERPL-SCNC: 3.2 MMOL/L — LOW (ref 3.5–5.3)
RBC # BLD: 2.91 M/UL — LOW (ref 3.8–5.2)
RBC # FLD: 13.9 % — SIGNIFICANT CHANGE UP (ref 10.3–14.5)
RH IG SCN BLD-IMP: POSITIVE — SIGNIFICANT CHANGE UP
SODIUM SERPL-SCNC: 141 MMOL/L — SIGNIFICANT CHANGE UP (ref 135–145)
WBC # BLD: 12.81 K/UL — HIGH (ref 3.8–10.5)
WBC # FLD AUTO: 12.81 K/UL — HIGH (ref 3.8–10.5)

## 2019-05-26 PROCEDURE — 99232 SBSQ HOSP IP/OBS MODERATE 35: CPT

## 2019-05-26 RX ORDER — POTASSIUM CHLORIDE 20 MEQ
20 PACKET (EA) ORAL ONCE
Refills: 0 | Status: COMPLETED | OUTPATIENT
Start: 2019-05-26 | End: 2019-05-26

## 2019-05-26 RX ADMIN — Medication 100 MILLIGRAM(S): at 22:06

## 2019-05-26 RX ADMIN — Medication 20 MILLIEQUIVALENT(S): at 10:47

## 2019-05-26 RX ADMIN — TRAMADOL HYDROCHLORIDE 50 MILLIGRAM(S): 50 TABLET ORAL at 00:10

## 2019-05-26 RX ADMIN — Medication 325 MILLIGRAM(S): at 07:35

## 2019-05-26 RX ADMIN — LATANOPROST 1 DROP(S): 0.05 SOLUTION/ DROPS OPHTHALMIC; TOPICAL at 22:05

## 2019-05-26 RX ADMIN — Medication 975 MILLIGRAM(S): at 14:16

## 2019-05-26 RX ADMIN — SENNA PLUS 2 TABLET(S): 8.6 TABLET ORAL at 22:06

## 2019-05-26 RX ADMIN — Medication 1 MILLIGRAM(S): at 11:25

## 2019-05-26 RX ADMIN — ATORVASTATIN CALCIUM 20 MILLIGRAM(S): 80 TABLET, FILM COATED ORAL at 22:07

## 2019-05-26 RX ADMIN — TRAMADOL HYDROCHLORIDE 50 MILLIGRAM(S): 50 TABLET ORAL at 14:16

## 2019-05-26 RX ADMIN — Medication 325 MILLIGRAM(S): at 05:33

## 2019-05-26 RX ADMIN — TRAMADOL HYDROCHLORIDE 50 MILLIGRAM(S): 50 TABLET ORAL at 15:16

## 2019-05-26 RX ADMIN — Medication 500 MILLIGRAM(S): at 17:30

## 2019-05-26 RX ADMIN — Medication 500 MILLIGRAM(S): at 05:33

## 2019-05-26 RX ADMIN — Medication 325 MILLIGRAM(S): at 17:30

## 2019-05-26 RX ADMIN — Medication 975 MILLIGRAM(S): at 15:10

## 2019-05-26 RX ADMIN — TRAMADOL HYDROCHLORIDE 50 MILLIGRAM(S): 50 TABLET ORAL at 01:10

## 2019-05-26 RX ADMIN — Medication 975 MILLIGRAM(S): at 06:33

## 2019-05-26 RX ADMIN — Medication 975 MILLIGRAM(S): at 05:33

## 2019-05-26 RX ADMIN — Medication 1 TABLET(S): at 11:25

## 2019-05-26 RX ADMIN — Medication 975 MILLIGRAM(S): at 22:05

## 2019-05-26 RX ADMIN — Medication 325 MILLIGRAM(S): at 14:14

## 2019-05-26 RX ADMIN — Medication 975 MILLIGRAM(S): at 23:05
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
## 2019-05-26 NOTE — DISCHARGE NOTE NURSING/CASE MANAGEMENT/SOCIAL WORK - NSDCDPATPORTLINK_GEN_ALL_CORE
You can access the [facility] Portal, offered by [facility], by registering with the following website: [email or web address]

## 2019-05-27 LAB
ANION GAP SERPL CALC-SCNC: 10 MMOL/L — SIGNIFICANT CHANGE UP (ref 5–17)
BUN SERPL-MCNC: 18 MG/DL — SIGNIFICANT CHANGE UP (ref 7–23)
CALCIUM SERPL-MCNC: 8.5 MG/DL — SIGNIFICANT CHANGE UP (ref 8.4–10.5)
CHLORIDE SERPL-SCNC: 108 MMOL/L — SIGNIFICANT CHANGE UP (ref 96–108)
CO2 SERPL-SCNC: 25 MMOL/L — SIGNIFICANT CHANGE UP (ref 22–31)
CREAT SERPL-MCNC: 0.7 MG/DL — SIGNIFICANT CHANGE UP (ref 0.5–1.3)
GLUCOSE SERPL-MCNC: 127 MG/DL — HIGH (ref 70–99)
HCT VFR BLD CALC: 25.1 % — LOW (ref 34.5–45)
HGB BLD-MCNC: 7.9 G/DL — LOW (ref 11.5–15.5)
MCHC RBC-ENTMCNC: 30.7 PG — SIGNIFICANT CHANGE UP (ref 27–34)
MCHC RBC-ENTMCNC: 31.5 GM/DL — LOW (ref 32–36)
MCV RBC AUTO: 97.7 FL — SIGNIFICANT CHANGE UP (ref 80–100)
NRBC # BLD: 0 /100 WBCS — SIGNIFICANT CHANGE UP (ref 0–0)
PLATELET # BLD AUTO: 239 K/UL — SIGNIFICANT CHANGE UP (ref 150–400)
POTASSIUM SERPL-MCNC: 3.8 MMOL/L — SIGNIFICANT CHANGE UP (ref 3.5–5.3)
POTASSIUM SERPL-SCNC: 3.8 MMOL/L — SIGNIFICANT CHANGE UP (ref 3.5–5.3)
RBC # BLD: 2.57 M/UL — LOW (ref 3.8–5.2)
RBC # FLD: 14 % — SIGNIFICANT CHANGE UP (ref 10.3–14.5)
SODIUM SERPL-SCNC: 143 MMOL/L — SIGNIFICANT CHANGE UP (ref 135–145)
WBC # BLD: 9.5 K/UL — SIGNIFICANT CHANGE UP (ref 3.8–10.5)
WBC # FLD AUTO: 9.5 K/UL — SIGNIFICANT CHANGE UP (ref 3.8–10.5)

## 2019-05-27 RX ADMIN — Medication 975 MILLIGRAM(S): at 22:27

## 2019-05-27 RX ADMIN — Medication 100 MILLIGRAM(S): at 05:15

## 2019-05-27 RX ADMIN — TRAMADOL HYDROCHLORIDE 50 MILLIGRAM(S): 50 TABLET ORAL at 14:40

## 2019-05-27 RX ADMIN — TRAMADOL HYDROCHLORIDE 50 MILLIGRAM(S): 50 TABLET ORAL at 14:15

## 2019-05-27 RX ADMIN — Medication 1 MILLIGRAM(S): at 14:08

## 2019-05-27 RX ADMIN — Medication 325 MILLIGRAM(S): at 18:13

## 2019-05-27 RX ADMIN — ATORVASTATIN CALCIUM 20 MILLIGRAM(S): 80 TABLET, FILM COATED ORAL at 21:27

## 2019-05-27 RX ADMIN — Medication 975 MILLIGRAM(S): at 14:08

## 2019-05-27 RX ADMIN — Medication 975 MILLIGRAM(S): at 05:15

## 2019-05-27 RX ADMIN — LATANOPROST 1 DROP(S): 0.05 SOLUTION/ DROPS OPHTHALMIC; TOPICAL at 21:26

## 2019-05-27 RX ADMIN — Medication 975 MILLIGRAM(S): at 06:15

## 2019-05-27 RX ADMIN — Medication 100 MILLIGRAM(S): at 21:27

## 2019-05-27 RX ADMIN — Medication 500 MILLIGRAM(S): at 05:15

## 2019-05-27 RX ADMIN — Medication 500 MILLIGRAM(S): at 18:13

## 2019-05-27 RX ADMIN — Medication 325 MILLIGRAM(S): at 14:08

## 2019-05-27 RX ADMIN — Medication 325 MILLIGRAM(S): at 05:15

## 2019-05-27 RX ADMIN — Medication 975 MILLIGRAM(S): at 21:27

## 2019-05-27 RX ADMIN — Medication 1 TABLET(S): at 14:08

## 2019-05-27 RX ADMIN — Medication 325 MILLIGRAM(S): at 07:43

## 2019-05-27 NOTE — PROGRESS NOTE ADULT - SUBJECTIVE AND OBJECTIVE BOX
Ortho Note    Pt comfortable without complaints, pain controlled  Denies CP, SOB, N/V, numbness/tingling     Vital Signs Last 24 Hrs  T(C): 36.2 (05-27-19 @ 05:13), Max: 36.2 (05-27-19 @ 05:13)  T(F): 97.2 (05-27-19 @ 05:13), Max: 97.2 (05-27-19 @ 05:13)  HR: 88 (05-27-19 @ 05:13) (88 - 88)  BP: 98/53 (05-27-19 @ 05:13) (98/53 - 98/53)  BP(mean): --  RR: 16 (05-27-19 @ 05:13) (16 - 16)  SpO2: 95% (05-27-19 @ 05:13) (95% - 95%)    General: Pt Alert and oriented, NAD  R hip DSG C/D/I  Pulses: 2+ DP/PT  Sensation: s/s/sp/dp/t intact  Motor: EHL/FHL/TA/GS 5/5                          7.9    9.50  )-----------( 239      ( 27 May 2019 06:30 )             25.1   27 May 2019 06:30    143    |  108    |  18     ----------------------------<  127    3.8     |  25     |  0.70     Ca    8.5        27 May 2019 06:30        A/P: 74F s/p R hip IMN 5/22          -Stable  -Pain Control  -PT/WBAT  -DVT ppx: ASA  -Dispo: awaiting [facility] placement     Ortho Pager [phone]

## 2019-05-28 LAB
ANION GAP SERPL CALC-SCNC: 10 MMOL/L — SIGNIFICANT CHANGE UP (ref 5–17)
BUN SERPL-MCNC: 19 MG/DL — SIGNIFICANT CHANGE UP (ref 7–23)
CALCIUM SERPL-MCNC: 8.3 MG/DL — LOW (ref 8.4–10.5)
CHLORIDE SERPL-SCNC: 107 MMOL/L — SIGNIFICANT CHANGE UP (ref 96–108)
CO2 SERPL-SCNC: 24 MMOL/L — SIGNIFICANT CHANGE UP (ref 22–31)
CREAT SERPL-MCNC: 0.65 MG/DL — SIGNIFICANT CHANGE UP (ref 0.5–1.3)
GLUCOSE SERPL-MCNC: 138 MG/DL — HIGH (ref 70–99)
HCT VFR BLD CALC: 25.1 % — LOW (ref 34.5–45)
HGB BLD-MCNC: 8.1 G/DL — LOW (ref 11.5–15.5)
MCHC RBC-ENTMCNC: 31 PG — SIGNIFICANT CHANGE UP (ref 27–34)
MCHC RBC-ENTMCNC: 32.3 GM/DL — SIGNIFICANT CHANGE UP (ref 32–36)
MCV RBC AUTO: 96.2 FL — SIGNIFICANT CHANGE UP (ref 80–100)
NRBC # BLD: 0 /100 WBCS — SIGNIFICANT CHANGE UP (ref 0–0)
PLATELET # BLD AUTO: 265 K/UL — SIGNIFICANT CHANGE UP (ref 150–400)
POTASSIUM SERPL-MCNC: 3.9 MMOL/L — SIGNIFICANT CHANGE UP (ref 3.5–5.3)
POTASSIUM SERPL-SCNC: 3.9 MMOL/L — SIGNIFICANT CHANGE UP (ref 3.5–5.3)
RBC # BLD: 2.61 M/UL — LOW (ref 3.8–5.2)
RBC # FLD: 14.3 % — SIGNIFICANT CHANGE UP (ref 10.3–14.5)
SODIUM SERPL-SCNC: 141 MMOL/L — SIGNIFICANT CHANGE UP (ref 135–145)
WBC # BLD: 9.21 K/UL — SIGNIFICANT CHANGE UP (ref 3.8–10.5)
WBC # FLD AUTO: 9.21 K/UL — SIGNIFICANT CHANGE UP (ref 3.8–10.5)

## 2019-05-28 RX ORDER — FOLIC ACID 0.8 MG
1 TABLET ORAL
Qty: 0 | Refills: 0 | DISCHARGE
Start: 2019-05-28

## 2019-05-28 RX ORDER — TRAMADOL HYDROCHLORIDE 50 MG/1
1 TABLET ORAL
Qty: 0 | Refills: 0 | DISCHARGE
Start: 2019-05-28

## 2019-05-28 RX ORDER — POLYETHYLENE GLYCOL 3350 17 G/17G
17 POWDER, FOR SOLUTION ORAL
Qty: 0 | Refills: 0 | DISCHARGE
Start: 2019-05-28

## 2019-05-28 RX ORDER — ASCORBIC ACID 60 MG
1 TABLET,CHEWABLE ORAL
Qty: 0 | Refills: 0 | DISCHARGE
Start: 2019-05-28

## 2019-05-28 RX ORDER — ASPIRIN/CALCIUM CARB/MAGNESIUM 324 MG
1 TABLET ORAL
Qty: 0 | Refills: 0 | DISCHARGE

## 2019-05-28 RX ORDER — TRAMADOL HYDROCHLORIDE 50 MG/1
0.5 TABLET ORAL
Qty: 0 | Refills: 0 | DISCHARGE
Start: 2019-05-28

## 2019-05-28 RX ORDER — ACETAMINOPHEN 500 MG
3 TABLET ORAL
Qty: 0 | Refills: 0 | DISCHARGE
Start: 2019-05-28

## 2019-05-28 RX ORDER — SENNA PLUS 8.6 MG/1
2 TABLET ORAL
Qty: 0 | Refills: 0 | DISCHARGE
Start: 2019-05-28

## 2019-05-28 RX ADMIN — TRAMADOL HYDROCHLORIDE 50 MILLIGRAM(S): 50 TABLET ORAL at 02:07

## 2019-05-28 RX ADMIN — Medication 325 MILLIGRAM(S): at 18:50

## 2019-05-28 RX ADMIN — Medication 975 MILLIGRAM(S): at 21:52

## 2019-05-28 RX ADMIN — TRAMADOL HYDROCHLORIDE 50 MILLIGRAM(S): 50 TABLET ORAL at 16:32

## 2019-05-28 RX ADMIN — Medication 975 MILLIGRAM(S): at 22:52

## 2019-05-28 RX ADMIN — LISINOPRIL 40 MILLIGRAM(S): 2.5 TABLET ORAL at 18:50

## 2019-05-28 RX ADMIN — ATORVASTATIN CALCIUM 20 MILLIGRAM(S): 80 TABLET, FILM COATED ORAL at 21:52

## 2019-05-28 RX ADMIN — Medication 325 MILLIGRAM(S): at 07:45

## 2019-05-28 RX ADMIN — Medication 325 MILLIGRAM(S): at 11:49

## 2019-05-28 RX ADMIN — TRAMADOL HYDROCHLORIDE 50 MILLIGRAM(S): 50 TABLET ORAL at 15:38

## 2019-05-28 RX ADMIN — Medication 975 MILLIGRAM(S): at 15:37

## 2019-05-28 RX ADMIN — Medication 975 MILLIGRAM(S): at 16:42

## 2019-05-28 RX ADMIN — TRAMADOL HYDROCHLORIDE 50 MILLIGRAM(S): 50 TABLET ORAL at 10:59

## 2019-05-28 RX ADMIN — LATANOPROST 1 DROP(S): 0.05 SOLUTION/ DROPS OPHTHALMIC; TOPICAL at 21:52

## 2019-05-28 RX ADMIN — Medication 975 MILLIGRAM(S): at 06:09

## 2019-05-28 RX ADMIN — Medication 975 MILLIGRAM(S): at 05:09

## 2019-05-28 RX ADMIN — TRAMADOL HYDROCHLORIDE 50 MILLIGRAM(S): 50 TABLET ORAL at 10:26

## 2019-05-28 RX ADMIN — TRAMADOL HYDROCHLORIDE 50 MILLIGRAM(S): 50 TABLET ORAL at 03:00

## 2019-05-28 RX ADMIN — Medication 1 MILLIGRAM(S): at 11:49

## 2019-05-28 RX ADMIN — Medication 325 MILLIGRAM(S): at 05:09

## 2019-05-28 RX ADMIN — Medication 500 MILLIGRAM(S): at 05:09

## 2019-05-28 RX ADMIN — Medication 500 MILLIGRAM(S): at 18:50

## 2019-05-28 RX ADMIN — Medication 1 TABLET(S): at 11:49

## 2019-05-28 RX ADMIN — Medication 100 MILLIGRAM(S): at 21:52

## 2019-05-28 NOTE — PROGRESS NOTE ADULT - SUBJECTIVE AND OBJECTIVE BOX
Ortho Note    Pt comfortable without complaints, pain controlled  Denies CP, SOB, N/V, numbness/tingling     Vital Signs Last 24 Hrs  T(C): 36.7 (05-28-19 @ 15:22), Max: 36.7 (05-28-19 @ 15:22)  T(F): 98.1 (05-28-19 @ 15:22), Max: 98.1 (05-28-19 @ 15:22)  HR: 88 (05-28-19 @ 15:22) (88 - 88)  BP: 129/60 (05-28-19 @ 15:22) (129/60 - 129/60)  BP(mean): --  RR: 16 (05-28-19 @ 15:22) (16 - 16)  SpO2: 98% (05-28-19 @ 15:22) (98% - 98%)    General: Pt Alert and oriented, NAD  Right hip dsg changed  Pulses intact RLE  Sensation intact RLE  Motor: EHL/FHL/TA/GS 5/5 RLE                          8.1    9.21  )-----------( 265      ( 28 May 2019 06:38 )             25.1   28 May 2019 06:38    141    |  107    |  19     ----------------------------<  138    3.9     |  24     |  0.65           A/P: 74yFemale POD#6 s/p Right hip IM Nail  - Stable  - Pain Control  - DVT ppx: asa  - PT, WBS: wbat  - Pending rehab    Ortho Pager [phone]

## 2019-05-28 NOTE — PROGRESS NOTE ADULT - SUBJECTIVE AND OBJECTIVE BOX
Ortho Note    Pt comfortable without complaints, pain controlled  Denies CP, SOB, N/V, numbness/tingling     Vital Signs Last 24 Hrs  T(C): 36.9 (28 May 2019 04:48), Max: 37.1 (27 May 2019 20:14)  T(F): 98.5 (28 May 2019 04:48), Max: 98.8 (27 May 2019 20:14)  HR: 92 (28 May 2019 04:48) (83 - 92)  BP: 120/58 (28 May 2019 04:48) (100/55 - 120/58)  BP(mean): --  RR: 16 (28 May 2019 04:48) (15 - 17)  SpO2: 95% (28 May 2019 04:48) (95% - 97%)    General: Pt Alert and oriented, NAD  R hip DSG with dry staining, otherwise intact  Pulses: 2+ DP/PT  Sensation: s/s/sp/dp/t intact  Motor: EHL/FHL/TA/GS 5/5                                       7.9    9.50  )-----------( 239      ( 27 May 2019 06:30 )             25.1         A/P: 74F s/p R hip IMN 5/22          -Stable  -Pain Control  -PT/WBAT  -DVT ppx: ASA  -Dispo: awaiting [facility] placement     Ortho Pager [phone]

## 2019-05-29 LAB
ANION GAP SERPL CALC-SCNC: 7 MMOL/L — SIGNIFICANT CHANGE UP (ref 5–17)
BUN SERPL-MCNC: 18 MG/DL — SIGNIFICANT CHANGE UP (ref 7–23)
CALCIUM SERPL-MCNC: 8.4 MG/DL — SIGNIFICANT CHANGE UP (ref 8.4–10.5)
CHLORIDE SERPL-SCNC: 107 MMOL/L — SIGNIFICANT CHANGE UP (ref 96–108)
CO2 SERPL-SCNC: 29 MMOL/L — SIGNIFICANT CHANGE UP (ref 22–31)
CREAT SERPL-MCNC: 0.67 MG/DL — SIGNIFICANT CHANGE UP (ref 0.5–1.3)
GLUCOSE SERPL-MCNC: 140 MG/DL — HIGH (ref 70–99)
HCT VFR BLD CALC: 25.4 % — LOW (ref 34.5–45)
HGB BLD-MCNC: 8 G/DL — LOW (ref 11.5–15.5)
MCHC RBC-ENTMCNC: 31.1 PG — SIGNIFICANT CHANGE UP (ref 27–34)
MCHC RBC-ENTMCNC: 31.5 GM/DL — LOW (ref 32–36)
MCV RBC AUTO: 98.8 FL — SIGNIFICANT CHANGE UP (ref 80–100)
NRBC # BLD: 0 /100 WBCS — SIGNIFICANT CHANGE UP (ref 0–0)
PLATELET # BLD AUTO: 267 K/UL — SIGNIFICANT CHANGE UP (ref 150–400)
POTASSIUM SERPL-MCNC: 3.9 MMOL/L — SIGNIFICANT CHANGE UP (ref 3.5–5.3)
POTASSIUM SERPL-SCNC: 3.9 MMOL/L — SIGNIFICANT CHANGE UP (ref 3.5–5.3)
RBC # BLD: 2.57 M/UL — LOW (ref 3.8–5.2)
RBC # FLD: 14.6 % — HIGH (ref 10.3–14.5)
SODIUM SERPL-SCNC: 143 MMOL/L — SIGNIFICANT CHANGE UP (ref 135–145)
WBC # BLD: 9.44 K/UL — SIGNIFICANT CHANGE UP (ref 3.8–10.5)
WBC # FLD AUTO: 9.44 K/UL — SIGNIFICANT CHANGE UP (ref 3.8–10.5)

## 2019-05-29 PROCEDURE — 99232 SBSQ HOSP IP/OBS MODERATE 35: CPT | Mod: GC

## 2019-05-29 RX ORDER — ALPRAZOLAM 0.25 MG
0.25 TABLET ORAL
Refills: 0 | Status: DISCONTINUED | OUTPATIENT
Start: 2019-05-29 | End: 2019-05-30

## 2019-05-29 RX ORDER — TRAMADOL HYDROCHLORIDE 50 MG/1
50 TABLET ORAL EVERY 4 HOURS
Refills: 0 | Status: DISCONTINUED | OUTPATIENT
Start: 2019-05-30 | End: 2019-05-30

## 2019-05-29 RX ORDER — TRAMADOL HYDROCHLORIDE 50 MG/1
25 TABLET ORAL EVERY 4 HOURS
Refills: 0 | Status: DISCONTINUED | OUTPATIENT
Start: 2019-05-30 | End: 2019-05-30

## 2019-05-29 RX ADMIN — Medication 975 MILLIGRAM(S): at 16:55

## 2019-05-29 RX ADMIN — TRAMADOL HYDROCHLORIDE 50 MILLIGRAM(S): 50 TABLET ORAL at 17:59

## 2019-05-29 RX ADMIN — Medication 1 TABLET(S): at 11:19

## 2019-05-29 RX ADMIN — Medication 975 MILLIGRAM(S): at 21:46

## 2019-05-29 RX ADMIN — TRAMADOL HYDROCHLORIDE 50 MILLIGRAM(S): 50 TABLET ORAL at 11:19

## 2019-05-29 RX ADMIN — TRAMADOL HYDROCHLORIDE 50 MILLIGRAM(S): 50 TABLET ORAL at 12:00

## 2019-05-29 RX ADMIN — Medication 325 MILLIGRAM(S): at 17:59

## 2019-05-29 RX ADMIN — Medication 975 MILLIGRAM(S): at 06:01

## 2019-05-29 RX ADMIN — ATORVASTATIN CALCIUM 20 MILLIGRAM(S): 80 TABLET, FILM COATED ORAL at 21:46

## 2019-05-29 RX ADMIN — Medication 0.25 MILLIGRAM(S): at 16:21

## 2019-05-29 RX ADMIN — Medication 500 MILLIGRAM(S): at 17:58

## 2019-05-29 RX ADMIN — Medication 325 MILLIGRAM(S): at 06:00

## 2019-05-29 RX ADMIN — LISINOPRIL 40 MILLIGRAM(S): 2.5 TABLET ORAL at 06:00

## 2019-05-29 RX ADMIN — Medication 975 MILLIGRAM(S): at 07:01

## 2019-05-29 RX ADMIN — Medication 325 MILLIGRAM(S): at 11:19

## 2019-05-29 RX ADMIN — Medication 975 MILLIGRAM(S): at 16:20

## 2019-05-29 RX ADMIN — Medication 100 MILLIGRAM(S): at 21:46

## 2019-05-29 RX ADMIN — Medication 1 MILLIGRAM(S): at 11:20

## 2019-05-29 RX ADMIN — Medication 975 MILLIGRAM(S): at 22:46

## 2019-05-29 RX ADMIN — LATANOPROST 1 DROP(S): 0.05 SOLUTION/ DROPS OPHTHALMIC; TOPICAL at 21:47

## 2019-05-29 RX ADMIN — Medication 500 MILLIGRAM(S): at 06:00
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
## 2019-05-29 NOTE — PROGRESS NOTE ADULT - SUBJECTIVE AND OBJECTIVE BOX
Interval Events: Reviewed  Patient seen and examined at bedside.    Patient is a 74y old  Female who presents with a chief complaint of R hip IT fx (29 May 2019 15:57)  she is doing well and she is waiting for the decision about rehab    PAST MEDICAL & SURGICAL HISTORY:  High cholesterol  HTN (hypertension)  No significant past surgical history      MEDICATIONS:  Pulmonary:    Antimicrobials:    Anticoagulants:  aspirin enteric coated 325 milliGRAM(s) Oral two times a day    Cardiac:  lisinopril 40 milliGRAM(s) Oral daily      Allergies    No Known Allergies    Intolerances        Vital Signs Last 24 Hrs  T(C): 36.4 (29 May 2019 15:41), Max: 37.6 (28 May 2019 20:06)  T(F): 97.6 (29 May 2019 15:41), Max: 99.7 (28 May 2019 20:06)  HR: 96 (29 May 2019 15:41) (85 - 96)  BP: 118/60 (29 May 2019 15:41) (96/59 - 123/68)  BP(mean): --  RR: 16 (29 May 2019 15:41) (16 - 17)  SpO2: 95% (29 May 2019 15:41) (95% - 99%)    05-28 @ 07:01 - 05-29 @ 07:00  --------------------------------------------------------  IN: 469 mL / OUT: 1100 mL / NET: -631 mL    05-29 @ 07:01 - 05-29 @ 19:13  --------------------------------------------------------  IN: 240 mL / OUT: 250 mL / NET: -10 mL          LABS:      CBC Full  -  ( 29 May 2019 07:16 )  WBC Count : 9.44 K/uL  RBC Count : 2.57 M/uL  Hemoglobin : 8.0 g/dL  Hematocrit : 25.4 %  Platelet Count - Automated : 267 K/uL  Mean Cell Volume : 98.8 fl  Mean Cell Hemoglobin : 31.1 pg  Mean Cell Hemoglobin Concentration : 31.5 gm/dL  Auto Neutrophil # : x  Auto Lymphocyte # : x  Auto Monocyte # : x  Auto Eosinophil # : x  Auto Basophil # : x  Auto Neutrophil % : x  Auto Lymphocyte % : x  Auto Monocyte % : x  Auto Eosinophil % : x  Auto Basophil % : x    05-29    143  |  107  |  18  ----------------------------<  140<H>  3.9   |  29  |  0.67    Ca    8.4      29 May 2019 07:16                          RADIOLOGY & ADDITIONAL STUDIES (The following images were personally reviewed):  Jones:                                     No  Urine output:                       adequate  DVT prophylaxis:                 Yes  Flattus:                                  Yes  Bowel movement:              No

## 2019-05-29 NOTE — PROGRESS NOTE ADULT - SUBJECTIVE AND OBJECTIVE BOX
Ortho Note    Pt comfortable without complaints, pain controlled  Denies CP, SOB, N/V, numbness/tingling     Vital Signs Last 24 Hrs  T(C): 36.4 (29 May 2019 04:46), Max: 37.6 (28 May 2019 20:06)  T(F): 97.5 (29 May 2019 04:46), Max: 99.7 (28 May 2019 20:06)  HR: 88 (29 May 2019 04:46) (80 - 103)  BP: 123/68 (29 May 2019 04:46) (105/64 - 129/60)  BP(mean): --  RR: 17 (29 May 2019 04:46) (16 - 17)  SpO2: 95% (29 May 2019 04:46) (95% - 99%)    General: Pt Alert and oriented, NAD  R hip DSG with dry staining, changed this am  Pulses: 2+ DP/PT  Sensation: s/s/sp/dp/t intact  Motor: EHL/FHL/TA/GS 5/5                                       8.0    9.44  )-----------( 267      ( 29 May 2019 07:16 )             25.4                        A/P: 74F s/p R hip IMN 5/22          -Stable  -Pain Control  -PT/WBAT  -DVT ppx: ASA  -Dispo: awaiting [facility] placement     Ortho Pager [phone]

## 2019-05-30 VITALS
OXYGEN SATURATION: 98 % | HEART RATE: 82 BPM | RESPIRATION RATE: 17 BRPM | DIASTOLIC BLOOD PRESSURE: 55 MMHG | SYSTOLIC BLOOD PRESSURE: 95 MMHG | TEMPERATURE: 98 F

## 2019-05-30 LAB
ANION GAP SERPL CALC-SCNC: 10 MMOL/L — SIGNIFICANT CHANGE UP (ref 5–17)
BUN SERPL-MCNC: 19 MG/DL — SIGNIFICANT CHANGE UP (ref 7–23)
CALCIUM SERPL-MCNC: 8.2 MG/DL — LOW (ref 8.4–10.5)
CHLORIDE SERPL-SCNC: 108 MMOL/L — SIGNIFICANT CHANGE UP (ref 96–108)
CO2 SERPL-SCNC: 26 MMOL/L — SIGNIFICANT CHANGE UP (ref 22–31)
CREAT SERPL-MCNC: 0.69 MG/DL — SIGNIFICANT CHANGE UP (ref 0.5–1.3)
GLUCOSE SERPL-MCNC: 136 MG/DL — HIGH (ref 70–99)
HCT VFR BLD CALC: 23.7 % — LOW (ref 34.5–45)
HCT VFR BLD CALC: 26.7 % — LOW (ref 34.5–45)
HGB BLD-MCNC: 7.4 G/DL — LOW (ref 11.5–15.5)
HGB BLD-MCNC: 8.4 G/DL — LOW (ref 11.5–15.5)
MCHC RBC-ENTMCNC: 31.1 PG — SIGNIFICANT CHANGE UP (ref 27–34)
MCHC RBC-ENTMCNC: 31.2 GM/DL — LOW (ref 32–36)
MCHC RBC-ENTMCNC: 31.5 GM/DL — LOW (ref 32–36)
MCHC RBC-ENTMCNC: 31.5 PG — SIGNIFICANT CHANGE UP (ref 27–34)
MCV RBC AUTO: 100 FL — SIGNIFICANT CHANGE UP (ref 80–100)
MCV RBC AUTO: 99.6 FL — SIGNIFICANT CHANGE UP (ref 80–100)
NRBC # BLD: 0 /100 WBCS — SIGNIFICANT CHANGE UP (ref 0–0)
NRBC # BLD: 0 /100 WBCS — SIGNIFICANT CHANGE UP (ref 0–0)
PLATELET # BLD AUTO: 264 K/UL — SIGNIFICANT CHANGE UP (ref 150–400)
PLATELET # BLD AUTO: 345 K/UL — SIGNIFICANT CHANGE UP (ref 150–400)
POTASSIUM SERPL-MCNC: 4 MMOL/L — SIGNIFICANT CHANGE UP (ref 3.5–5.3)
POTASSIUM SERPL-SCNC: 4 MMOL/L — SIGNIFICANT CHANGE UP (ref 3.5–5.3)
RBC # BLD: 2.38 M/UL — LOW (ref 3.8–5.2)
RBC # BLD: 2.67 M/UL — LOW (ref 3.8–5.2)
RBC # FLD: 14.9 % — HIGH (ref 10.3–14.5)
RBC # FLD: 15.1 % — HIGH (ref 10.3–14.5)
SODIUM SERPL-SCNC: 144 MMOL/L — SIGNIFICANT CHANGE UP (ref 135–145)
WBC # BLD: 11.05 K/UL — HIGH (ref 3.8–10.5)
WBC # BLD: 8.99 K/UL — SIGNIFICANT CHANGE UP (ref 3.8–10.5)
WBC # FLD AUTO: 11.05 K/UL — HIGH (ref 3.8–10.5)
WBC # FLD AUTO: 8.99 K/UL — SIGNIFICANT CHANGE UP (ref 3.8–10.5)

## 2019-05-30 PROCEDURE — 99232 SBSQ HOSP IP/OBS MODERATE 35: CPT | Mod: GC

## 2019-05-30 RX ORDER — LISINOPRIL 2.5 MG/1
20 TABLET ORAL DAILY
Refills: 0 | Status: DISCONTINUED | OUTPATIENT
Start: 2019-05-31 | End: 2019-05-30

## 2019-05-30 RX ADMIN — Medication 975 MILLIGRAM(S): at 05:35

## 2019-05-30 RX ADMIN — Medication 325 MILLIGRAM(S): at 05:35

## 2019-05-30 RX ADMIN — Medication 975 MILLIGRAM(S): at 13:50

## 2019-05-30 RX ADMIN — Medication 325 MILLIGRAM(S): at 07:01

## 2019-05-30 RX ADMIN — TRAMADOL HYDROCHLORIDE 50 MILLIGRAM(S): 50 TABLET ORAL at 13:50

## 2019-05-30 RX ADMIN — Medication 500 MILLIGRAM(S): at 05:35

## 2019-05-30 RX ADMIN — Medication 975 MILLIGRAM(S): at 06:35
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
## 2019-05-30 NOTE — PROGRESS NOTE ADULT - SUBJECTIVE AND OBJECTIVE BOX
Ortho Note    Pt comfortable without complaints, pain controlled  Denies CP, SOB, N/V, numbness/tingling     Vital Signs Last 24 Hrs  T(C): 37.1 (30 May 2019 05:02), Max: 37.1 (30 May 2019 05:02)  T(F): 98.7 (30 May 2019 05:02), Max: 98.7 (30 May 2019 05:02)  HR: 83 (30 May 2019 05:02) (82 - 96)  BP: 102/67 (30 May 2019 05:02) (90/55 - 118/60)  BP(mean): --  RR: 16 (30 May 2019 05:02) (16 - 17)  SpO2: 96% (30 May 2019 05:02) (95% - 98%)    General: Pt Alert and oriented, NAD  R hip DSG with dry staining  Pulses: 2+ DP/PT  Sensation: s/s/sp/dp/t intact  Motor: EHL/FHL/TA/GS 5/5                                              8.0    9.44  )-----------( 267      ( 29 May 2019 07:16 )             25.4                     A/P: 74F s/p R hip IMN 5/22          -Stable  -Pain Control  -PT/WBAT  -DVT ppx: ASA  -Dispo: awaiting [facility] placement vs home    Ortho Pager [phone]

## 2019-05-30 NOTE — PROGRESS NOTE ADULT - PROBLEM SELECTOR PROBLEM 2
Closed fracture of right hip, initial encounter
Postoperative state
Closed fracture of right hip, initial encounter

## 2019-05-30 NOTE — PROGRESS NOTE ADULT - PROBLEM SELECTOR PROBLEM 4
Pure hypercholesterolemia
Anemia due to blood loss
Pure hypercholesterolemia

## 2019-05-30 NOTE — PROGRESS NOTE ADULT - PROBLEM SELECTOR PLAN 6
continue atorvastatin
The patient is hemodynamically stable.  The pain is controlled.  Patient is on DVT prophylaxis.  Patient is using incentive spirometry.  Oxygen saturation is acceptable.  Advance diet as tolerated.  Advance activity as tolerated.  Monitor for ileus.  Patient is on Laxatives.  Surgical wound is stable.  No indication for monitor bed.

## 2019-05-30 NOTE — PROGRESS NOTE ADULT - ATTENDING COMMENTS
Patient seen and examined with house-staff during bedside rounds.  Resident note read, including vitals, physical findings, laboratory data, and radiological reports.   Revisions included below.  Direct personal management at bed side and extensive interpretation of the data.  Plan was outlined and discussed in details with the housestaff.  Decision making of high complexity  Action taken for acute disease activity to reflect the level of care provided:  - medication reconciliation  - review laboratory data  medically cleared for rehab
Patient seen and examined with house-staff during bedside rounds.  Resident note read, including vitals, physical findings, laboratory data, and radiological reports.   Revisions included below.  Direct personal management at bed side and extensive interpretation of the data.  Plan was outlined and discussed in details with the housestaff.  Decision making of high complexity  Action taken for acute disease activity to reflect the level of care provided:  - medication reconciliation  - review laboratory data
Patient seen and examined with house-staff during bedside rounds.  Resident note read, including vitals, physical findings, laboratory data, and radiological reports.   Revisions included below.  Direct personal management at bed side and extensive interpretation of the data.  Plan was outlined and discussed in details with the housestaff.  Decision making of high complexity  Action taken for acute disease activity to reflect the level of care provided:  - medication reconciliation  - review laboratory data
Patient seen and examined with house-staff during bedside rounds.  Resident note read, including vitals, physical findings, laboratory data, and radiological reports.   Revisions included below.  Direct personal management at bed side and extensive interpretation of the data.  Plan was outlined and discussed in details with the housestaff.  Decision making of high complexity  Action taken for acute disease activity to reflect the level of care provided:  - medication reconciliation  - review laboratory data  medically cleared for rehab  I discussed with the patient the need to be on DVT prophylaxis for the 35 days from surgery.

## 2019-05-30 NOTE — PROGRESS NOTE ADULT - PROBLEM SELECTOR PROBLEM 6
HLD (hyperlipidemia)
Postoperative state

## 2019-05-30 NOTE — PROGRESS NOTE ADULT - PROBLEM SELECTOR PLAN 2
Patient is stable postoperatively and the pain is controlled. Patient is stable to start physical therapy today
continue pain control prn, WBAT, PT, GI/DVT prophylaxis  consider d/c to rehab center
Patient is stable postoperatively and the pain is controlled. Patient is stable to start physical therapy today

## 2019-05-30 NOTE — PROGRESS NOTE ADULT - REASON FOR ADMISSION
R hip IT fx
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
## 2019-05-30 NOTE — PROGRESS NOTE ADULT - PROBLEM SELECTOR PROBLEM 3
Essential hypertension
Hypokalemia
Essential hypertension

## 2019-05-30 NOTE — PROGRESS NOTE ADULT - PROVIDER SPECIALTY LIST ADULT
Internal Medicine
Orthopedics
Internal Medicine

## 2019-05-30 NOTE — PROGRESS NOTE ADULT - PROBLEM SELECTOR PROBLEM 7
Malignant neoplasm of left female breast, unspecified estrogen receptor status, unspecified site of breast
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
## 2019-05-30 NOTE — PROGRESS NOTE ADULT - PROBLEM SELECTOR PLAN 5
BP stable, continue lisinopril and monitoring
No evidence of infection. Followup the white count.

## 2019-05-30 NOTE — PROGRESS NOTE ADULT - PROBLEM SELECTOR PLAN 4
Patient is on statin and tolerated it well
s/p transfusion with adequate increase in H&H  continue monitoring
Patient is on statin and tolerated it well
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
## 2019-05-30 NOTE — PROGRESS NOTE ADULT - PROBLEM SELECTOR PROBLEM 5
Other elevated white blood cell (WBC) count
HTN (hypertension)
Other elevated white blood cell (WBC) count
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
## 2019-06-04 DIAGNOSIS — S72.141A DISPLACED INTERTROCHANTERIC FRACTURE OF RIGHT FEMUR, INITIAL ENCOUNTER FOR CLOSED FRACTURE: ICD-10-CM
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
## 2019-06-04 DIAGNOSIS — C50.912 MALIGNANT NEOPLASM OF UNSPECIFIED SITE OF LEFT FEMALE BREAST: ICD-10-CM

## 2019-06-04 DIAGNOSIS — D62 ACUTE POSTHEMORRHAGIC ANEMIA: ICD-10-CM

## 2019-06-04 DIAGNOSIS — E78.5 HYPERLIPIDEMIA, UNSPECIFIED: ICD-10-CM

## 2019-06-04 DIAGNOSIS — Z79.82 LONG TERM (CURRENT) USE OF ASPIRIN: ICD-10-CM

## 2019-06-04 DIAGNOSIS — I10 ESSENTIAL (PRIMARY) HYPERTENSION: ICD-10-CM

## 2019-06-04 DIAGNOSIS — K21.9 GASTRO-ESOPHAGEAL REFLUX DISEASE WITHOUT ESOPHAGITIS: ICD-10-CM

## 2019-06-04 DIAGNOSIS — E87.6 HYPOKALEMIA: ICD-10-CM

## 2019-07-01 ENCOUNTER — APPOINTMENT (OUTPATIENT)
Dept: VASCULAR SURGERY | Facility: CLINIC | Age: 74
End: 2019-07-01
Payer: MEDICARE

## 2019-07-01 PROBLEM — I10 ESSENTIAL (PRIMARY) HYPERTENSION: Chronic | Status: ACTIVE | Noted: 2019-05-21

## 2019-07-01 PROBLEM — E78.00 PURE HYPERCHOLESTEROLEMIA, UNSPECIFIED: Chronic | Status: ACTIVE | Noted: 2019-05-21

## 2019-07-01 PROCEDURE — 93971 EXTREMITY STUDY: CPT

## 2019-07-10 PROCEDURE — 86900 BLOOD TYPING SEROLOGIC ABO: CPT

## 2019-07-10 PROCEDURE — 85027 COMPLETE CBC AUTOMATED: CPT

## 2019-07-10 PROCEDURE — 86923 COMPATIBILITY TEST ELECTRIC: CPT

## 2019-07-10 PROCEDURE — 36415 COLL VENOUS BLD VENIPUNCTURE: CPT

## 2019-07-10 PROCEDURE — 71045 X-RAY EXAM CHEST 1 VIEW: CPT

## 2019-07-10 PROCEDURE — 86850 RBC ANTIBODY SCREEN: CPT

## 2019-07-10 PROCEDURE — 85610 PROTHROMBIN TIME: CPT

## 2019-07-10 PROCEDURE — 73502 X-RAY EXAM HIP UNI 2-3 VIEWS: CPT

## 2019-07-10 PROCEDURE — 93005 ELECTROCARDIOGRAM TRACING: CPT

## 2019-07-10 PROCEDURE — 85730 THROMBOPLASTIN TIME PARTIAL: CPT

## 2019-07-10 PROCEDURE — 97162 PT EVAL MOD COMPLEX 30 MIN: CPT

## 2019-07-10 PROCEDURE — C1889: CPT

## 2019-07-10 PROCEDURE — 97116 GAIT TRAINING THERAPY: CPT

## 2019-07-10 PROCEDURE — 97161 PT EVAL LOW COMPLEX 20 MIN: CPT

## 2019-07-10 PROCEDURE — 80053 COMPREHEN METABOLIC PANEL: CPT

## 2019-07-10 PROCEDURE — 85025 COMPLETE CBC W/AUTO DIFF WBC: CPT

## 2019-07-10 PROCEDURE — 86901 BLOOD TYPING SEROLOGIC RH(D): CPT

## 2019-07-10 PROCEDURE — 36430 TRANSFUSION BLD/BLD COMPNT: CPT

## 2019-07-10 PROCEDURE — 81003 URINALYSIS AUTO W/O SCOPE: CPT

## 2019-07-10 PROCEDURE — 76000 FLUOROSCOPY <1 HR PHYS/QHP: CPT

## 2019-07-10 PROCEDURE — 87641 MR-STAPH DNA AMP PROBE: CPT

## 2019-07-10 PROCEDURE — C1776: CPT

## 2019-07-10 PROCEDURE — P9016: CPT

## 2019-07-10 PROCEDURE — 97110 THERAPEUTIC EXERCISES: CPT

## 2019-07-10 PROCEDURE — 72192 CT PELVIS W/O DYE: CPT

## 2019-07-10 PROCEDURE — C1769: CPT

## 2019-07-10 PROCEDURE — 99285 EMERGENCY DEPT VISIT HI MDM: CPT | Mod: 25

## 2019-07-10 PROCEDURE — C1713: CPT

## 2019-07-10 PROCEDURE — 73552 X-RAY EXAM OF FEMUR 2/>: CPT

## 2019-07-10 PROCEDURE — 80048 BASIC METABOLIC PNL TOTAL CA: CPT

## 2019-07-11 ENCOUNTER — NON-APPOINTMENT (OUTPATIENT)
Age: 74
End: 2019-07-11

## 2019-07-11 ENCOUNTER — APPOINTMENT (OUTPATIENT)
Dept: OPHTHALMOLOGY | Facility: CLINIC | Age: 74
End: 2019-07-11
Payer: MEDICARE

## 2019-07-11 PROCEDURE — 92014 COMPRE OPH EXAM EST PT 1/>: CPT

## 2019-11-14 ENCOUNTER — APPOINTMENT (OUTPATIENT)
Dept: OPHTHALMOLOGY | Facility: CLINIC | Age: 74
End: 2019-11-14
Payer: MEDICARE

## 2019-11-14 ENCOUNTER — NON-APPOINTMENT (OUTPATIENT)
Age: 74
End: 2019-11-14

## 2019-11-14 PROCEDURE — 92014 COMPRE OPH EXAM EST PT 1/>: CPT

## 2019-11-14 PROCEDURE — 92083 EXTENDED VISUAL FIELD XM: CPT

## 2020-03-12 ENCOUNTER — NON-APPOINTMENT (OUTPATIENT)
Age: 75
End: 2020-03-12

## 2020-03-12 ENCOUNTER — APPOINTMENT (OUTPATIENT)
Dept: OPHTHALMOLOGY | Facility: CLINIC | Age: 75
End: 2020-03-12
Payer: MEDICARE

## 2020-03-12 PROCEDURE — 92014 COMPRE OPH EXAM EST PT 1/>: CPT
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
## 2020-09-10 ENCOUNTER — APPOINTMENT (OUTPATIENT)
Dept: OPHTHALMOLOGY | Facility: CLINIC | Age: 75
End: 2020-09-10

## 2020-11-06 ENCOUNTER — APPOINTMENT (OUTPATIENT)
Dept: OPHTHALMOLOGY | Facility: CLINIC | Age: 75
End: 2020-11-06
Payer: MEDICARE

## 2020-11-06 ENCOUNTER — NON-APPOINTMENT (OUTPATIENT)
Age: 75
End: 2020-11-06

## 2020-11-06 PROCEDURE — 92133 CPTRZD OPH DX IMG PST SGM ON: CPT

## 2020-11-06 PROCEDURE — 92083 EXTENDED VISUAL FIELD XM: CPT

## 2020-11-06 PROCEDURE — 92012 INTRM OPH EXAM EST PATIENT: CPT

## 2020-11-06 PROCEDURE — 99072 ADDL SUPL MATRL&STAF TM PHE: CPT

## 2020-12-01 ENCOUNTER — APPOINTMENT (OUTPATIENT)
Dept: OPHTHALMOLOGY | Facility: CLINIC | Age: 75
End: 2020-12-01

## 2021-05-06 ENCOUNTER — APPOINTMENT (OUTPATIENT)
Dept: OPHTHALMOLOGY | Facility: CLINIC | Age: 76
End: 2021-05-06
Payer: MEDICARE

## 2021-05-06 ENCOUNTER — NON-APPOINTMENT (OUTPATIENT)
Age: 76
End: 2021-05-06

## 2021-05-06 PROCEDURE — 99072 ADDL SUPL MATRL&STAF TM PHE: CPT

## 2021-05-06 PROCEDURE — 92014 COMPRE OPH EXAM EST PT 1/>: CPT

## 2021-05-06 PROCEDURE — 92250 FUNDUS PHOTOGRAPHY W/I&R: CPT

## 2021-05-06 PROCEDURE — 92083 EXTENDED VISUAL FIELD XM: CPT

## 2021-11-04 ENCOUNTER — NON-APPOINTMENT (OUTPATIENT)
Age: 76
End: 2021-11-04

## 2021-11-04 ENCOUNTER — APPOINTMENT (OUTPATIENT)
Dept: OPHTHALMOLOGY | Facility: CLINIC | Age: 76
End: 2021-11-04
Payer: MEDICARE

## 2021-11-04 PROCEDURE — 92012 INTRM OPH EXAM EST PATIENT: CPT

## 2021-11-04 PROCEDURE — 92133 CPTRZD OPH DX IMG PST SGM ON: CPT

## 2021-11-04 PROCEDURE — 92083 EXTENDED VISUAL FIELD XM: CPT

## 2022-05-05 ENCOUNTER — NON-APPOINTMENT (OUTPATIENT)
Age: 77
End: 2022-05-05

## 2022-05-05 ENCOUNTER — APPOINTMENT (OUTPATIENT)
Dept: OPHTHALMOLOGY | Facility: CLINIC | Age: 77
End: 2022-05-05
Payer: MEDICARE

## 2022-05-05 PROCEDURE — 92012 INTRM OPH EXAM EST PATIENT: CPT

## 2022-05-05 PROCEDURE — 92083 EXTENDED VISUAL FIELD XM: CPT

## 2022-07-27 ENCOUNTER — APPOINTMENT (OUTPATIENT)
Dept: ORTHOPEDIC SURGERY | Facility: CLINIC | Age: 77
End: 2022-07-27

## 2022-07-27 VITALS — WEIGHT: 140 LBS | BODY MASS INDEX: 25.76 KG/M2 | HEIGHT: 62 IN

## 2022-07-27 DIAGNOSIS — Z80.0 FAMILY HISTORY OF MALIGNANT NEOPLASM OF DIGESTIVE ORGANS: ICD-10-CM

## 2022-07-27 DIAGNOSIS — Z85.3 PERSONAL HISTORY OF MALIGNANT NEOPLASM OF BREAST: ICD-10-CM

## 2022-07-27 DIAGNOSIS — R29.898 OTHER SYMPTOMS AND SIGNS INVOLVING THE MUSCULOSKELETAL SYSTEM: ICD-10-CM

## 2022-07-27 DIAGNOSIS — Z78.9 OTHER SPECIFIED HEALTH STATUS: ICD-10-CM

## 2022-07-27 DIAGNOSIS — Z85.828 PERSONAL HISTORY OF OTHER MALIGNANT NEOPLASM OF SKIN: ICD-10-CM

## 2022-07-27 DIAGNOSIS — Z82.3 FAMILY HISTORY OF STROKE: ICD-10-CM

## 2022-07-27 DIAGNOSIS — M25.462 EFFUSION, LEFT KNEE: ICD-10-CM

## 2022-07-27 DIAGNOSIS — M17.0 BILATERAL PRIMARY OSTEOARTHRITIS OF KNEE: ICD-10-CM

## 2022-07-27 DIAGNOSIS — Z86.39 PERSONAL HISTORY OF OTHER ENDOCRINE, NUTRITIONAL AND METABOLIC DISEASE: ICD-10-CM

## 2022-07-27 DIAGNOSIS — Z86.79 PERSONAL HISTORY OF OTHER DISEASES OF THE CIRCULATORY SYSTEM: ICD-10-CM

## 2022-07-27 DIAGNOSIS — Z83.3 FAMILY HISTORY OF DIABETES MELLITUS: ICD-10-CM

## 2022-07-27 PROCEDURE — 73564 X-RAY EXAM KNEE 4 OR MORE: CPT | Mod: LT,RT

## 2022-07-27 PROCEDURE — 99213 OFFICE O/P EST LOW 20 MIN: CPT | Mod: 25

## 2022-07-27 PROCEDURE — 20610 DRAIN/INJ JOINT/BURSA W/O US: CPT | Mod: 59,LT

## 2022-07-27 RX ORDER — ESCITALOPRAM OXALATE 5 MG/1
5 TABLET ORAL
Qty: 90 | Refills: 0 | Status: ACTIVE | COMMUNITY
Start: 2022-05-02

## 2022-07-27 RX ORDER — ATORVASTATIN CALCIUM 20 MG/1
20 TABLET, FILM COATED ORAL
Qty: 90 | Refills: 0 | Status: ACTIVE | COMMUNITY
Start: 2022-05-05

## 2022-07-27 RX ORDER — LATANOPROST/PF 0.005 %
0.01 DROPS OPHTHALMIC (EYE)
Qty: 8 | Refills: 0 | Status: ACTIVE | COMMUNITY
Start: 2022-05-05

## 2022-07-27 RX ORDER — TRIAMTERENE AND HYDROCHLOROTHIAZIDE 25; 37.5 MG/1; MG/1
37.5-25 TABLET ORAL
Qty: 90 | Refills: 0 | Status: ACTIVE | COMMUNITY
Start: 2022-02-10

## 2022-07-27 RX ORDER — TRAZODONE HYDROCHLORIDE 150 MG/1
150 TABLET ORAL
Qty: 90 | Refills: 0 | Status: ACTIVE | COMMUNITY
Start: 2022-05-02

## 2022-07-27 RX ORDER — TRAZODONE HYDROCHLORIDE 100 MG/1
100 TABLET ORAL
Qty: 90 | Refills: 0 | Status: ACTIVE | COMMUNITY
Start: 2022-05-02

## 2022-07-27 NOTE — PHYSICAL EXAM
[Cane] : ambulates with cane [LE] : Sensory: Intact in bilateral lower extremities [Normal RLE] : Right Lower Extremity: No scars, rashes, lesions, ulcers, skin intact [Normal LLE] : Left Lower Extremity: No scars, rashes, lesions, ulcers, skin intact [Normal Touch] : sensation intact for touch [Normal] : Oriented to person, place, and time, insight and judgement were intact and the affect was normal [de-identified] : LEFT knee\par She walks with a significant limp with her right pelvis dropping as she walks.\par Well-healed incision right hip\par Left knee is with small effusion.  Tenderness medial joint line and patella facets.\par Small left knee effusion.  No erythema or warmth.\par Normal varus and valgus and AP stability.  Negative Anastacia.\par Ia Lachman.\par Intact extensor mechanism.\par She has significant weakness in her left leg with resisted straight leg raise, seated hip flexion and hip abduction.\par Sensation is intact.\par Balance is poor with difficulty standing on 1 foot worse on the right than left [de-identified] : \par \par X-rays taken today of bilateral knees weightbearing 4 views show degenerative changes mild to moderate patellofemoral and lateral compartment of the left knee.  Mild degenerative changes right knee.  There is a large oval ossific density in the lateral aspect of the right knee.  No bone lesions or fractures.

## 2022-07-27 NOTE — PROCEDURE
[Aspiration] : Aspiration [Injection] : Injection [Left] : of the left [Knee Joint] : knee joint [Effusion] : Effusion [Inflammation] : Inflammation [Patient] : patient [Risk] : risk [Benefits] : benefits [Alternatives] : alternatives [Bleeding] : bleeding [Infection] : infection [Allergic Reaction] : allergic reaction [Verbal Consent Obtained] : verbal consent was obtained prior to the procedure [Alcohol] : Alcohol [Ethyl Chloride Spray] : ethyl chloride spray was used as a topical anesthetic [Lateral] : lateral [20] : a 20-gauge [___ mL Fluid] : [unfilled] mL of [Yellow] : yellow [Clear] : clear [Same Needle/New Syringe] : the syringe was changed and the same needle was left in place and [1% Lidocaine___(mL)] : [unfilled] mL of 1% Lidocaine [Triamcinolone 40mg/mL___(mL)] : [unfilled] ~UmL of 40mg/mL triamcinolone [Bandage Applied] : a bandage [Tolerated Well] : The patient tolerated the procedure well [None] : none [No Strenuous Activity___day(s)] : avoid strenuous activity for [unfilled] day(s) [PRN] : as needed [FreeTextEntry1] : ChloraPrep
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
## 2022-11-10 ENCOUNTER — APPOINTMENT (OUTPATIENT)
Dept: OPHTHALMOLOGY | Facility: CLINIC | Age: 77
End: 2022-11-10

## 2022-11-10 ENCOUNTER — NON-APPOINTMENT (OUTPATIENT)
Age: 77
End: 2022-11-10

## 2022-11-10 PROCEDURE — 92012 INTRM OPH EXAM EST PATIENT: CPT

## 2022-11-10 PROCEDURE — 92133 CPTRZD OPH DX IMG PST SGM ON: CPT

## 2022-11-10 PROCEDURE — 92083 EXTENDED VISUAL FIELD XM: CPT

## 2023-01-03 NOTE — OCCUPATIONAL THERAPY INITIAL EVALUATION ADULT - ASSISTIVE DEVICE FOR TOILET TRANSFER, REHAB EVAL
Additional Notes: Patient may call in for ketoconazole shampoo. Patient going back to [place] for masters degree Detail Level: Simple Render Risk Assessment In Note?: no commode/rolling walker

## 2023-05-04 ENCOUNTER — NON-APPOINTMENT (OUTPATIENT)
Age: 78
End: 2023-05-04

## 2023-05-04 ENCOUNTER — APPOINTMENT (OUTPATIENT)
Dept: OPHTHALMOLOGY | Facility: CLINIC | Age: 78
End: 2023-05-04
Payer: MEDICARE

## 2023-05-04 PROCEDURE — 92014 COMPRE OPH EXAM EST PT 1/>: CPT

## 2023-05-04 PROCEDURE — 92083 EXTENDED VISUAL FIELD XM: CPT

## 2023-05-04 PROCEDURE — 92250 FUNDUS PHOTOGRAPHY W/I&R: CPT

## 2023-07-06 NOTE — PRE-OP CHECKLIST - AS TEMP SITE
2023       Vahid Tyson MD  [address] [facility] [place] [address]  Via In Basket      Patient: Chrissy Soares   YOB: 1973   Date of Visit: 2023       Dear Dr. Tyson:    Thank you for referring Chrissy Soares to me for evaluation. Below are my notes for this visit with her.    If you have questions, please do not hesitate to call me. I look forward to following your patient along with you.      Sincerely,        Jose Mercado MD        CC: No Recipients  Jose Mercado MD  2023  5:30 PM  Signed      Patient:Chrissy Soares  MRN:[id number]  :1973      Referring Physician:  Vahid Tyson MD  [phone]    History of Present Illness:  50-year-old female here to establish local cardiovascular care.  She has an exceptionally complex history and multiple records were reviewed.  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  Says her last episode of chest pain was in April.  She is currently scheduled for a CTA of the aorta and iliofemoral vessels and to be followed by Dr. Steven Salazar having previously had in-stent stenosis of the left SFA and had a drug-coated balloon after dilatation this was determined during evaluation of an ischemic ulcer on her left heel.  She also apparently has a closure device for a PFO in place with an echo that shows good function normal LV and RV function and no other abnormalities.  According to the records from Dr. Solomon Sanchez who is seeing her for her hematologic problems she is factor V Leiden with multiple clots and pulmonary emboli and DVT with an IVC filter in place for the last 17 years  Past Medical History:   Diagnosis Date   • Anemia    • Asthma    •  Cataract    • Chronic gastritis    • Colon polyp    • Coronary artery disease    • Diabetes mellitus (CMD)     BLOOD SUGAR 352, RECHECKED 363. DR. MCKEON AND DR. TELLEZ MADE AWARE. PT ASYMPTOMATIC   • DVT (deep venous thrombosis) (CMD)    • Epilepsy (CMD)    • Essential (primary) hypertension    • GERD (gastroesophageal reflux disease)    • Heart burn    • Hyperlipidemia    • Hypomagnesemia    • Limb swelling    • JENNIFER (obstructive sleep apnea)    • Pulmonary embolism (CMD)    • Seizure disorder (CMD)     PATIENT HAS VAGAL NERVE STIMULATOR   • Seizure disorder (CMD)    • Thrombophilia (CMD)    • Venous insufficiency of left leg    • Ventricular tachycardia (CMD)    Last outside cardiology visit-Visit Date: 5/15/2023     NAME: Chrissy Soares  MRN: [id number]   YOB: 1973    REFERRING PHYSICIAN: Vahid Tyson MD    Chief Complaint:   I am seeing Chrissy Soares, a 49 Y female, in follow uptoday for a history of: CAD.    HISTORY OF PRESENT ILLNESS:    Patient is a 49 year old female with complicated medical history, history of CAD, history of T2DM, history of arthritis, history of [place] occluder device across atrial septum, last cath 7/2019 showed patent ramus stent and mild tomoderate lesions in other vessels. She was previously following Dr. Wagner, has been following me in my clinic in last year. She lives in [place], but takes transportation up her for appointments. Has a history of claudication as we;;    Had minimally displaced distal fibula fracture, injury 5/12/2022. She also had hospitalization for seizures, status epilepticus recently, as she was not administered her seizure medications while she was at a facility following one her hospitalizations.    She was also hospitalized with bowel obstruction in the relatively recent past.    She has a history of epilepsy, peripheral neuropathy, DVT, PE, on Eliquis therapy    She had spine surgery in 11/2022      She is followed by Dr. Holloway, for her peripheral  vascular disease history and history of claudication. She sees him later today. She has had bilateral superficial femoral artery interventions in past. She will be having an angio this July and just saw Dr. Holloway this morning    On April 17, she had some type of opthamologic procedure, and when she went to bed that night she woke up with breathing issues. When she woke up 2 hours later her chest was bother her. She took NTG and it got better. But had nother episode a short wile later, short of breath, and palpitaitons, felt like \"horse on chest'    NP recommended increase in carvedilol and addition of Imdur. She was reluctant to add Imdur, and has not done this    She asked her PCP to do EKG and this was provided to me today - NSR, rate 90, occasional PVCs    She does feel intermittent heart racing. No further chest pains. Stress test was ordered and she has not yet had this scheduled    Recent echo shpwed LV function is normal, RV function is normal, no significant valve issues, and no evidence of residual shunting      Had CMP 9/2022, Creatine 0.72, K 4.2    Echocardiogram 8/2022  INTERPRETATION SUMMARY:   Left ventricle is normal in size.   There is normal left ventricular systolic function.   The quantitative LVEF based on modified Forde's method is 60%.   Right ventricle is normal in size.   There is normal right ventricular systolic function.   A closure device is well seated across the atrial septum and has no color   Doppler evidence of residual shunt.     [facility]   09/16/2019, no major changes     Cath 7/2019  SUMMARY:     HEMODYNAMICS:   No AO gradient. Elevated LVEDP. No MR.     CORONARY CIRCULATION:   Left main: The vessel was normal sized. No disease.   LAD: The vessel was normal sized. 30 to 40% eccentric ostial LAD, smooth 50 to   60% mid LAD on bend at origin of diagonal branch, 30 to 40% modest sized   diagonal, moderate sized minimal  diseased distal LAD. Circumflex: The vessel   was normal sized. Chronic 50% ostial ramus/OM1, continued successful ramsus   stent with less than 50% stenosis. 60% mid circumflex before large moderately   diseased OM, 50% distal circumflex as bifurcation lesion with small caliber   diseased LPLB. RCA: The vessel was medium sized. Co-dominant. Mild proximal,   mid and distal disease, small distal vessel.     CARDIAC STRUCTURES:   Normal size LV. Normal wall motion. Normal EF. No MR. Hamilton septal occluder in   place. Prominent splay of discs. Remains locked without evidence frame fracture.     IMPRESSIONS: Elevated LVEDP. Normal LV systolic function. No MR. Continued   patient ramus stent. Progressive now overall moderate coronary artery disease.     Anticipate: trial continued medical therapy and continued attempted risk factor   modification including weight loss and glucose control to include SGLT2     Echo 8/8/2022    INTERPRETATION SUMMARY:   Left ventricle is normal in size.   There is normal left ventricular systolic function.   The quantitative LVEF based on modified Forde's method is 60%.   Right ventricle is normal in size.   There is normal right ventricular systolic function.   A closure device is well seated across the atrial septum and has no color   Doppler evidence of residual shunt.     Compared to prior [facility] echocardiogram that was performed on   09/16/2019, no major changes     Past Medical History:   Diagnosis Date   Acute medial meniscus tear of left knee 10/27/2014   Adhesive capsulitis of left shoulder 05/13/2015   Left shoulder manipulation under anesthesia   Anemia   Asthma   CAD (coronary artery disease) 04/08/2008   Taxus in [facility], done at [facility] in [place], [place]   Diabetes mellitus with ophthalmic manifestation (CMS-HCC)   DVT (deep venous thrombosis) (CMS-HCC) 2013   Arms, legs, cranial, distal IVC   Dyslexia   Diagnosed in early childhood   Enchondroma of femur, right  2014   Enchondroma of humerus, right 06/28/2010   Surgical resection and insertion of a bone graft   Epilepsy (CMS-HCC)   at birth, s/p vagal nerve stimulator 3/8/12 at [email or web address]   Epistaxis   Frozen shoulder   Bilateral   H/O iron deficiency anemia   Hidradenitis suppurativa   Hx of prothrombin mutation   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 Mcintosh   Hyperlipidemia   Hypersomnia   Sleep Apnea   Hypertension   Hypomagnesemia   Learning disability   Migraine   PICC (peripherally inserted central catheter) in place   Right   Post-phlebitic syndrome   Pulmonary embolism (CMS-HCC) 2003   IVC filter May 2005   Pulmonary nodules   Thrombophilia (CMS-HCC)   V-tach (CMS-HCC)   Venous insufficiency of left leg   Vitamin D deficiency     Past Surgical History:   Procedure Laterality Date   ------------OTHER------------- Left 12/31/2014   Left Knee Arthroscopy   ------------OTHER-------------   Tonsillectomy Age 23   APPENDECTOMY   Age 18   CARPAL TUNNEL RELEASE Right 1998   Ulnar Tunnel and Carpal Tunnel   COLONOSCOPY 09/16/2012   Internal and external hemorrhoids, Dr. Teran   COLONOSCOPY 04/28/2016   Dr. Castellanos   COLONOSCOPY 08/17/2017   Dr. Castellanos/normal aside from poor prep   CORONARY STENT PLACEMENT 01/25/2014   Ramus: Resolute 2.25 x 12 Taxus Stent   ESOPHAGOGASTRODUODENOSCOPY 04/28/2016   HYSTERECTOMY 12/27/2006   [facility]   IR ANGIOGRAPHY PELVIC/LOWER EXTREMITY Left 05/02/2022   OOPHORECTOMY 2008   PATENT FORAMEN OVALE CLOSURE 02/28/2011   [place] occluder at [facility] in [place]   CO EXPLORATORY OF ABDOMEN 05/2012   Adnexal mass left/[place]   VAGUS NERVE STIMULATOR INSERTION     FAMILY HISTORY:  Family History   Problem Relation Age of Onset   Other Mother   TIA   Asthma Mother   COPD Father   long history of smoker   Emphysema Father   Cardiovascular disease Father   Kidney Disease Father   Other Father   AAA   Thyroid Negative     Social History     Socioeconomic History   Marital status: Single   Highest education level:  Associate degree: academic program   Tobacco Use   Smoking status: Never   Passive exposure: Never   Smokeless tobacco: Never   Substance and Sexual Activity   Alcohol use: Not Currently   Drug use: Never   Social History Narrative   Chrissy lives alone   Mom lives next door   Not working   Lots of hobbies- sequins, [employer]   Chrissy walks everywhere     Social Determinants of Health     Tobacco Use: Low Risk   Smoking Tobacco Use: Never   Smokeless Tobacco Use: Never   Passive Exposure: Never   Alcohol Use: Not At Risk   Frequency of Alcohol Consumption: Never   Average Number of Drinks: Patient does not drink   Frequency of Binge Drinking: Never   Financial Resource Strain: Low Risk   Difficulty of Paying Living Expenses: Not hard at all   Food Insecurity: Unknown   Worried About Running Out of Food in the Last Year: Never true   Ran Out of Food in the Last Year: Patient refused   Transportation Needs: Unmet Transportation Needs   Lack of Transportation (Medical): Yes   Lack of Transportation (Non-Medical): No   Physical Activity: Inactive   Days of Exercise per Week: 0 days   Minutes of Exercise per Session: 10 min   Stress: Unknown   Feeling of Stress : Patient refused   Social Connections: Moderately Isolated   Frequency of Communication with Friends and Family: More than three times a week   Frequency of Social Gatherings with Friends and Family: Never   Attends Anabaptist Services: 1 to 4 times per year   Active Member of Clubs or Organizations: No   Attends Club or Organization Meetings: Never   Marital Status: Never    Intimate Partner Violence: Not At Risk   Fear of Current or Ex-Partner: No   Emotionally Abused: No   Physically Abused: No   Sexually Abused: No   Depression: Not at risk   PHQ-2 Score: 0   Housing Stability: Unknown   Unable to Pay for Housing in the Last Year: No   Unstable Housing in the Last Year: No     Ms. Soares is 49 Y and is a nonuser of tobacco.     REVIEW OF  SYSTEMS:  Constitutional: reports fatigue and weight gain   HEENT: reports cataracts  Endocrine: reports H/O DM Type II   Cardiovascular: reports leg swelling, hypertension  Respiratory: reports NONE  GI: reports recent \"bowel blockage\"  Musculoskeletal: reports NONE  Neurological: reports NONE  Psychiatric: reports NONE    Allergies   Allergen Reactions   Bee Sting CV - edema   Benzoin CV - anaphylaxis and SKIN - rash   Celecoxib CV - anaphylaxis   Cephalexin CV - anaphylaxis   Received multiple courses Augmentin [id number]  Tolerated Ertapenem 8/21   Ciprofloxacin SKIN - pruritis, SKIN - rash and CV - anaphylaxis   Clarithromycin CV - anaphylaxis   Codeine SKIN - pruritis, SKIN - rash and CV - anaphylaxis   Diclofenac SKIN - hives   Levofloxacin SKIN - pruritis, SKIN - rash, CV - anaphylaxis and SKIN - photosensitivity   Misoprostol CV - anaphylaxis   Naproxen SKIN - pruritis, SKIN - rash and CV - anaphylaxis   Tramadol OTHER (explain in comments)   Causes issues with seizures   Zonisamide CV - anaphylaxis   Acetaminophen-Hydrocodone SKIN - rash   Acticoat Flex 3 4\"X4\" [Dome-Paste Bandage] UNKNOWN   Alogliptin SKIN - rash   Amlodipine OTHER (explain in comments)   Patient believes she had facial swelling   Atorvastatin MUSC/SKEL - arthralgia   Carbamazepine UNKNOWN   Chlorhexidine SKIN - pruritis and CV - anaphylaxis   Patient requests ALCOHOL and SORBAVIEW for PORT ACCESS - contact VAT if sorbaview unavailable in your department   Dairy Products SKIN - hives   Milk only. Patient ok with other dairy products.   Doxycycline SKIN - rash   Burn   Fluorescein UNKNOWN   Gadodiamide SKIN - hives   Gadolinium UNKNOWN   Gentamicin UNKNOWN   Glycol-Polyurethane SKIN - pruritis   Insulin CV - edema   Novolog insulin   Insulin Detemir GI - diarrhea   Insulin Glulisine UNKNOWN   Insulin Regular Human EENT - angioedema   Iodinated Diagnostic X-Ray Materials CV - anaphylaxis and SKIN - rash   Per patient on 8/4/20   Iodine SKIN  - pruritis and SKIN - rash   Topical Iodine  DR. TELLEZ MADE AWARE    Iohexol SKIN - pruritis   Lacosamide UNKNOWN   Lamotrigine SKIN - rash   Levetiracetam OTHER (explain in comments), UNKNOWN and CV - anaphylaxis   Seizures   Linezolid CV - edema, EENT - angioedema and OTHER (explain in comments)   Lyrica [Pregabalin] OTHER (explain in comments)   Sleep walking   Novolog [Insulin Aspart] UNKNOWN   Phenobarbital UNKNOWN   Phenytoin OTHER (explain in comments)   Loss of speech, swollen gums   Povidone Iodine SKIN - rash   Ramipril UNKNOWN   Other reaction(s): Unknown, unknown - Unknown   Reteplase SKIN - hives, RESP - shortness of breath and SKIN - rash   Seba-Nil Mask [Polyethylene Glycol] RESP - shortness of breath   Strawberry SKIN - hives   Sulfa Drugs CV - edema and CV - anaphylaxis   Sulfamethoxazole W-Trimethoprim SKIN - pruritis and SKIN - rash   Tape SKIN - rash and OTHER (explain in comments)   Skin burn   Tegaderm Alginate Ag OTHER (explain in comments)   Tetracyclines SKIN - pruritis and GI - nausea and/or vomiting   Per patient vomiting \"a long time ago\"   Trimethoprim SKIN - photosensitivity   Vancomycin SKIN - rash and GI - nausea and/or vomiting   Vimpat UNKNOWN   Aspirin-Acetaminophen-Caffeine SKIN - rash     Current Outpatient Medications   Medication Sig   acetaminophen (TYLENOL) 500 MG tablet Take 2 tablets (1,000 mg total) by mouth every 6 hours as needed for mild pain, moderate pain, headache or fever.   albuterol  (90 Base) MCG/ACT inhaler Inhale 1-2 puffs every 4 hours as needed.   apixaban (ELIQUIS) 5 MG tablet Take 1 tablet (5 mg total) by mouth 2 times daily.   aspirin 81 MG chew tablet Take 81 mg by mouth daily.   bumetanide (BUMEX) 1 MG tablet Take 3 tablets (3 mg total) by mouth daily.   bumetanide (BUMEX) 1 MG tablet Take 1 tablet (1 mg total) by mouth daily.   carvedilol (COREG) 12.5 MG tablet Take 1 tablet (12.5 mg total) by mouth 2 times daily.   clonazePAM (KLONOPIN) 0.25 MG  disintegrating tablet Take 0.25 mg by mouth nightly as needed.   Coenzyme Q-10 100 MG CAPS Take 1 capsule by mouth daily.   Continuous Blood Gluc  (FREESTYLE TYLER READER) REX 1 Device continuously. Use as directed; Dx code: E11.42   Continuous Blood Gluc Sensor (FREESTYLE TYLER SENSOR SYSTEM) MISC 1 Device continuously. Change every 14 days and scan every 8 hours; Dx code: E11.42 Hand faxed to [employer]   cyanocobalamin (VITAMIN B-12) 1000 MCG/ML injection Inject 1,000 mcg intramuscularly every 2 weeks. Inject 1 ml into the muscle every 14 days   CYMBALTA 30 MG delayed release capsule Take 30 mg by mouth nightly.   ELIQUIS 5 MG tablet TAKE 1 TABLET BY MOUTH TWICE A DAY   ergocalciferol (VITAMIN D) 71945 units capsule Take 50,000 Units by mouth once per week. Mondays   esomeprazole magnesium (NEXIUM) 40 MG delayed release capsule Take 40 mg by mouth daily.   EVENITY 105 MG/1.17ML Solution Prefilled Syringe injection monthly.   evolocumab (REPATHA SURECLICK) 140 MG/ML auto-injector Inject 1 mL (140 mg total) subcutaneously every 2 weeks.   fluticasone/salmetrol HFA (ADVAIR HFA) 115-21 mcg/act inhaler Inhale 2 puffs twice daily as needed.   insulin glargine (LANTUS SOLOSTAR) 100 UNIT/ML pen-injector Inject 26 Units subcutaneously nightly.   Insulin Lispro (HUMALOG KWIKPEN) 200 UNIT/ML pen-injector Inject 24 Units subcutaneously 3 times daily before meals.   isosorbide mononitrate (IMDUR) 30 MG extended release tablet Take 1 tablet (30 mg total) by mouth daily.   lisinopril (PRINIVIL OR ZESTRIL) 10 MG tablet Take 1 tablet (10 mg total) by mouth daily.   lisinopril (PRINIVIL OR ZESTRIL) 10 MG tablet Take 1 tablet (10 mg total) by mouth daily.   LORazepam (ATIVAN) 2 MG tablet Take 2 mg by mouth twice daily as needed.   metOLazone (ZAROXOLYN) 5 MG tablet Take 1 tablet (5 mg total) by mouth 1 time weekly.   MOUNJARO, 2.5 MG/DOSE, 2.5 MG/0.5ML pen-injector 1 time weekly.   NEURONTIN 300 MG capsule Take 2  capsules (600 mg total) by mouth 3 times daily.   nitroglycerin (NITROLINGUAL) spray Take 1 Spray under the tongue every 5 minutes as needed for chest pain.   REPATHA SURECLICK 140 MG/ML auto-injector INJECT 1 ML (140 MG TOTAL) SUBCUTANEOUSLY EVERY 2 WEEKS.   rizatriptan (MAXALT) 10 MG tablet Take 10 mg by mouth once. Take 1 tablet as directed. Maximum dose 30 mg (3 tabs) in 24 hour period.   rosuvastatin (CRESTOR) 40 MG tablet Take 1 tablet (40 mg total) by mouth nightly.   sodium fluoride 1.1 % paste Apply 1 Dose via dental route daily.   tiZANidine (ZANAFLEX) 2 MG tablet Take 2 mg by mouth 3 times daily as needed. ReasonsDo not take this medication while weaning off of dilaudid.   topiramate (TOPAMAX) 200 MG tablet Take 200 mg by mouth 2 times daily.     PHYSICAL EXAM:  Vitals:   05/15/23 1224   BP: 146/82   Pulse: 97   Patient Position During BP: Sitting   BP taken on: Left Upper Arm   Cuff Size: Adult Regular   Resp: 18   Temp: 96.3 °F (35.7 °C)   SpO2: 100%   Height: 1.676 m (5' 6\")   Weight: 98.4 kg (217 lb)     GEneral No acute distress     HEENT: deferred   NECK: no jugular venous distension   CHEST: normal   LUNGS: clear all lung field, A-P bilaterally and equal breath sounds   CARDIAC: no murmur/gallop/rub  normal S1 and S2 heart sounds  regular rate and rhythm   ABDOMEN: no masses, no organomegaly, non-tender and soft   EXTREMITIES: no cyanosis, non-tender and Left foot in boot, minimal LE swelling   PULSES: carotid normal  radial normal   PERIPHERAL VASCULAR EXAM: both carotids normal upstroke without bruits, pedal pulses normal both dorsalis pedis and posterior tibial   PSYCH: appropriate affect and calm mood   NEURO: Mental status: alert and oriented times 3 and Gait: Normal   CHEST X-RAY: not done   EKG: last ECG reviewed     Recent pertinent laboratories include:  Lab Results   Component Value Date   HGB 11.8 09/12/2022   HCT 36 09/12/2022   WBC 10.1 09/12/2022   PLTCT 185 09/12/2022     Lab Results    Component Value Date   BUN 33 (H) 09/12/2022    (L) 09/12/2022   K 4.7 09/12/2022   CL 94 (L) 09/12/2022   BICARB 29 09/12/2022   GLUCOSE 424 (H) 09/12/2022   CREATININE 1.00 09/12/2022   CA 9.2 09/12/2022     Lab Results   Component Value Date   CHOL 138 04/28/2022   LDL 64 04/28/2022   HDL 45 04/28/2022   TRIGLYCERIDE 145 04/28/2022   CHOLHDLR 3.07 04/28/2022     No results found for: HEPBSAG, HEPBCMAB, HEPCAB, HEPAMAB  Lab Results   Component Value Date   PROBNP 117 03/08/2021     Lab Results   Component Value Date   TSH 1.610 09/12/2022     Lab Results   Component Value Date   INR 1.3 04/26/2022       ASSESSMENT:  Patient is a medically complex patient with T2DM, DVT, PE, history of PAD, claudication, CAD, cath with patent ramus stent, and moderate disease elsewhere, history of gore occluder device placed for atrial level septal defect, history of seizures, recent admission for seizures, status, history of bowel obstruction, here for follow up HFpEF. She has minimal swelling LEs, appears compensated. Furthermore JVP appears normal, lungs clear. Her echocardiogram 8/2022 normal. No anginal symptoms. Infrequent and transient palpitations. She has history of thrombophilia and past history of DVT. Had recent spine surgery. She had some recent concerning episodes of palpitations, and chest pain last month    DIAGNOSIS:   HFpEF  CAD    RECOMMENDATIONS:  RTST previously ordered  Holter monitor 7 day ordered  She is meeting with pharmacy today per her request to go over her medications  She will be undergoing LE angio by Dr. Holloway in July    Cate Jamil MD      Past history as noted in HPI above  Past Surgical History:   Procedure Laterality Date   • ------------other-------------  02/13/2018    IR BISI CATH PLACEMENT   • Appendectomy  09/1995   • Carpal tunnel release Bilateral     Right Ulnar tunnel and carpal tunnel     • Colectomy      ischemic colitis, s/p intra abdominal abscess requiring  reexploration   • Colonoscopy     • Colonoscopy w biopsy  08/27/2022    polyp, inadequate prep   • Coronary stent placement  01/25/2014    S/P coronary artery stent placement  Ramus: Resolute 2.25x12 Taxus stent    • Hysterectomy  2007   • Ivc filter placement      PATIENT STATES IN BACK   • Knee arthroscopy w/ meniscectomy Left 12/31/2014   • Laparoscopic fundoplasty      1990s   • Lumbar fusion  11/29/2022    L5-S1   • Oophorectomy  2008   • Resection bone tumor humerus Right 06/28/2010    Right Enchodroma of bone   • Shoulder adhesion release Left 05/13/2015    left adhesive capsulitis     • Stent implant Bilateral 04/06/2021    lower extremities   • Toe amputation Right    • Tonsillectomy     • Total abdom hysterectomy  12/27/2006   • Upper gi endoscopy,exam       No family history on file.  Family history as noted in HPI above  Social History     Tobacco Use   • Smoking status: Never   • Smokeless tobacco: Never   Substance Use Topics   • Alcohol use: Never   • Drug use: Never     Social history as noted in HPI above  ALLERGIES:   Allergen Reactions   • Levetiracetam ANAPHYLAXIS and Other (See Comments)     seizures  Seizures  Seizures  Seizures  Other reaction(s): Other, Other, OTHER (explain in comments), Other (See Comments), UNKNOWN, unknown - Unknown  Seizures  seizures  Seizures  Seizures  Seizures     • Sulfa Antibiotics SWELLING and ANAPHYLAXIS   • Benzoin HIVES   • Benzoin Compound Tincture RASH   • Carbamazepine Other (See Comments)   • Celecoxib RASH   • Chlorhexidine RASH   • Codeine RASH   • Contrast Media RASH   • Gentamicin RASH   • Hydrocodone-Acetaminophen RASH   • Iodine RASH     DR. TELLEZ MADE AWARE   • Levaquin Other (See Comments)     PATIENT STATES \"THREW UP BLOOD OUT OF MY LUNGS\"   • Penicillins Other (See Comments)   • Phenobarbital Other (See Comments)   • Povidone Iodine Other (See Comments)     BETADINE PER PT   • Tegaderm Alginate Ag Other (See Comments)       Current Outpatient  Medications   Medication Sig Dispense Refill   • diphenhydrAMINE (BENADRYL) 50 MG/ML injectable solution at bedtime.     • nitroGLYCERIN (NITROLINGUAL) 0.4 MG/SPRAY translingual solution TAKE 1 SPRAY UNDER THE TONGUE EVERY 5 MINUTES AS NEEDED FOR CHEST PAIN.     • tirzepatide (MOUNJARO) 5 MG/0.5ML Solution Pen-injector Inject 0.5 mLs into the skin 1 day a week.     • Cyanocobalamin 1000 MCG/ML Kit Inject as directed every 14 days.     • empagliflozin (Jardiance) 25 MG tablet Take 25 mg by mouth daily (before breakfast).     • EVOLocumab (Repatha SureClick) 140 MG/ML injection Inject 140 mg into the skin every 14 days.     • aspirin (ECOTRIN) 81 MG EC tablet Take 81 mg by mouth daily.     • bumetanide (BUMEX) 1 MG tablet Take 3 mg by mouth 2 (two) times a day. Take 3 tab BID     • metOLazone (ZAROXOLYN) 5 MG tablet Take 5 mg by mouth 1 day a week.     • esomeprazole (NexIUM) 40 MG capsule Take 40 mg by mouth daily (before breakfast).     • carvedilol (COREG) 12.5 MG tablet Take 12.5 mg by mouth in the morning and 12.5 mg in the evening. Take with meals.     • LORazepam (ATIVAN) 2 MG tablet Take 2 mg by mouth in the morning and 2 mg in the evening.  1   • rosuvastatin (CRESTOR) 40 MG tablet Take 40 mg by mouth daily.     • insulin glargine (LANTUS SOLOSTAR, BASAGLAR) 100 UNIT/ML pen-injector Inject 30 Units into the skin nightly.     • insulin lispro (HUMALOG KWIKPEN) 100 UNIT/ML pen-injector Inject 24 Units into the skin in the morning and 24 Units at noon and 24 Units in the evening. Inject before meals.     • apixaBAN (ELIQUIS) 5 MG Tab Take by mouth every 12 hours.     • topiramate (TOPAMAX) 200 MG tablet Take 200 mg by mouth 2 times daily.     • lidocaine (LIDODERM) 5 % patch Apply topically as needed.     • lisinopril (ZESTRIL) 10 MG tablet Take 10 mg by mouth daily.     • tobramycin 0.3 % ophthalmic ointment        No current facility-administered medications for this visit.       Review of Systems:  Review of  Systems    Physical Examination:    Physical Exam    Lab Results:  23:  Sodium 133 - 146 mmol/L 137    Potassium 3.5 - 5.1 mmol/L 3.5    Chloride 98 - 107 mmol/L 98    Carbon Dioxide 21 - 31 mmol/L 28    Anion Gap 4 - 13 mmol/L 11    Blood Urea Nitrogen 7 - 25 mg/dL 30 High     Creatinine 0.60 - 1.30 mg/dL 1.15    eGFRcr (CKD-EPI ) >=60 mL/min/1.73 m² 57 Low     Calcium 8.3 - 10.5 mg/dL 9.3    Glucose 70 - 100 mg/dL 262 High     Protein, Total 6.4 - 8.3 g/dL 6.7    Albumin 3.5 - 5.0 g/dL 4.3    ALT 9 - 43 units/L 24    Alkaline Phosphatase 34 - 104 units/L 148 High     AST 13 - 39 units/L 19    Bilirubin, Total 0.2 - 1.2 mg/dL 0.8          EKG:  Results for orders placed or performed during the hospital encounter of 10/17/22   Electrocardiogram 12-Lead   Result Value Ref Range    Ventricular Rate EKG/Min (BPM) 97     Atrial Rate (BPM) 97     OH-Interval (MSEC) 160     QRS-Interval (MSEC) 74     QT-Interval (MSEC) 366     QTc 465     P Axis (Degrees) 59     R Axis (Degrees) -3     T Axis (Degrees) 38     REPORT TEXT       Normal sinus rhythm  Normal ECG  No previous ECGs available  Confirmed by OMAR INIGUEZ MD ([id number]) on 10/18/2022 2:52:41 AM     Normal sinus rhythm   Normal ECG   When compared with ECG of 19-MAY-2022 00:36,   No significant change was found   Confirmed by Lakshmi Morgan MD ([id number]) on 2022 11:53:12 PM     Imagin22:Sonographer:            HUA                                           Referring Physician:    CINTHYA LEDESMA                                 Interpreting Physician: [id number] Faustino Fernandez                                                                                                                                                                             Indications: RESPIRATORY FAILURE, EVALUATION OF RV SUSPECTING PE                                                                                                                                                            Study Details: 2 units IV Definity echo contrast was given to enhance                  endocardial definition.                                   Study Quality: The acoustic windows were technically limited.                                                                                                                                                             PHYSICIAN INTERPRETATION:                                                                                                                        CONCLUSIONS:                                                              1. The left ventricular cavity size is normal. Mild concentric left     ventricular hypertrophy.                                                  2. The visually estimated LV ejection fraction is 60-65%. Normal       global left ventricular systolic function. There is normal segmental     wall motion. Mild left ventricular diastolic dysfunction, Grade 1       impaired relaxation.                                                      3. Aortic valve is trileaflet. Mild aortic valve sclerosis . mild to   moderate aortic valve stenosis. No aortic valve regurgitation.            4. Normal mitral valve. Mild mitral annular calcification. Trace       mitral valve regurgitation.                                              5. The acoustic windows were technically limited.                                                                                                ----------------------------------------------------------------------   ECHOCARDIOGRAM FINDINGS:                                                 Left Ventricle: The left ventricular cavity size is normal. IVS wall     thickness is mildly increased. Posterior LV wall thickness is mildly     increased. Mild concentric left ventricular hypertrophy. The visually   estimated LV ejection fraction is 60-65%, normal EF. There is normal     global left ventricular systolic function. Normal  segmental wall         motion. Normal segmental wall motion. Left ventricular diastolic         function is mildly decreased, Grade 1 impaired relaxation. E/e' of       9-14, indeterminate filling pressure.                                   Left Atrium: The left atrium is normal in size by LA volume index       calculation.                                                             Right Atrium: The right atrium is technically unable to visualize.       Right Ventricle: The right ventricular size is technically unable to     visualize.                                                               Aortic Valve: The aortic valve is trileaflet. There is mild aortic       valve sclerosis. Mild to moderate degree of aortic stenosis is           present. No evidence of aortic valve regurgitation is seen.             Mitral Valve: The mitral valve is normal in structure. Mild mitral       annular calcification. Trace mitral valve regurgitation is seen.         Tricuspid Valve: Trivial tricuspid regurgitation is visualized.         Pulmonic Valve: The pulmonic valve is not well visualized. Trace         pulmonic valve regurgitation.                                           Pericardium: No evidence of pericardial effusion.                       Aorta: The aortic root size is normal.                                   Venous: Technically unable to assess pulmonary vein flow pattern. The   inferior vena cava was not well visualized.                             Additional Findings: The ECG is sinus tachycardia.                                                                                                Dimensions:                     Normal                                   Aortic Root d (2D): 3.1 cm     (2.4-3.8)                                 Left Atrium s (2D): 3.4 cm     (1.9-4.0)                                 LA Volume:          30.3 ml                                             LA Volume index:    14.7 ml/mÂ²                                            IVSd (2D):          1.4 cm     (0.6-1.1)                                 LVPWd (2D):         1.1 cm     (0.6-1.1)                                 LVIDd (2D):         3.6 cm     (3.4-5.7)                                 LVIDs (2D):         2.1 cm     (1.8-4.2)                                                                                                                                                                                   In-stent stenosis of left SFA and popliteal arteries.   2. Left SFA and popliteal artery stenoses angioplasty with   scoring balloon followed by 6 mm drug-eluting balloon  Narrative    Reason for exam: Peripheral vascular disease. Previous left   SFA stent with in-stent stenosis. Left heel ulcer.     Preprocedure diagnosis: Peripheral vascular disease.     Postprocedure diagnosis: Same.     Informed consent was obtained from the patient.     Cumulative air kerma: 197 mGy.     Sterile ultrasound probe cover and sterile ultrasound gel   were utilized. The right groin was prepared and draped in   sterile fashion. 1% lidocaine was used for local anesthesia.     Sonographic image of the patent right common femoral artery   was obtained and sent to PACS. Using direct sonographic   guidance, the right common femoral artery was accessed with   a 21-gauge micropuncture needle. A 0.018 inch guidewire was   advanced into the aorta. The tract was dilated with a   micropuncture set. A stiffened set was required due to   scarring in the right groin. Limited angiography of the   right groin demonstrated appropriate access in the right   common femoral artery. There is mild atherosclerotic disease   within the right common femoral artery.     Exchange was made for a 0.035 inch wire. Over this wire a 5   Vincentian sheath was placed. Through the sheath a 5 Vincentian Omni   Flush catheter was reconfigured in the distal aorta. Pelvic   angiography was performed using DSA  technique. The catheter   was then brought across the aortic bifurcation and a Odersunson   wire passed to the level of the left profunda femoral   artery. The Omni Flush catheter was then advanced to the   distal left external iliac artery. Left lower extremity   angiography was performed using DSA technique.     FINDINGS:   The common femoral artery is patent. The profunda femoral   artery is patent. Stents are present within the left SFA and   popliteal arteries. There is extensive hyperplasia within   the stents with multilevel greater than 50% narrowings. The   distal popliteal artery is patent. Runoff consists of a   patent anterior tibial artery which provides flow into the   dorsalis pedis artery and foot. There is a small, diseased   peroneal artery which is patent to the ankle and provides   collateralization to the foot. The posterior tibial artery   is small and occludes distally.     Interventions:     Exchange was then made for a Castellanos wire. Over this wire   exchange was made for a 6 Indonesian Destination sheath which   had its tip brought to the left common femoral artery. A   Blue Eggenstein catheter was then used to negotiate a Castellanos wire   to the distal popliteal artery. Exchange was then made for a   0.014 inch Command wire. Over this wire, a 5 mm x 200 mm   [employer] scoring balloon was utilized to dilate the   in-stent stenoses. The semicompliant balloon was dilated to   greater than 5 mm throughout the stents.     Exchange was then made for a 6 mm x 250 mm drug-eluting   in.pact balloon. This was used to dilate the distal aspect   of the stents. Exchange was then made for a 6 mm x 200 mm   drug-eluting balloon and this was used to dilate the   proximal portion of the stents. Repeat angiography was then   performed and demonstrated excellent luminal gain and   improved flow. Runoff was preserved.     Catheters were removed and hemostasis achieved at the right   groin with an Angio-Seal  device.      Assessment:  Patient Active Problem List    Diagnosis Date Noted   • Status post amputation of right great toe (CMD) 05/15/2022     Priority: Low   • Pressure injury of left heel, unstageable (CMD) 05/15/2022     Priority: Low   • GERD (gastroesophageal reflux disease)      Priority: Low   • Prothrombin gene mutation (CMD) 08/27/2021     Priority: Low     Formatting of this note might be different from the original.  Dr. Richmond (Tina) made the diagnosis.  Formatting of this note might be different from the original.  Formatting of this note might be different from the original.  Dr. Richmond (Tina) made the diagnosis.     • Seizure disorder (CMD) 08/27/2021     Priority: Low     Formatting of this note might be different from the original.  Has vagal nerve stimulator  Formatting of this note might be different from the original.  Formatting of this note might be different from the original.  Has vagal nerve stimulator     • Personality disorder (CMD) 04/15/2021     Priority: Low   • Type 2 diabetes mellitus with diabetic neuropathy, unspecified (CMD) 02/09/2021     Priority: Low   • Stented coronary artery 02/09/2021     Priority: Low   • S/P peripheral artery angioplasty with stent placement 02/09/2021     Priority: Low   • Mild persistent asthma without complication 02/09/2021     Priority: Low   • NPDR (nonproliferative diabetic retinopathy) (CMD) 02/06/2020     Priority: Low   • Migraine without aura and without status migrainosus, not intractable 07/19/2019     Priority: Low   • History of inferior vena caval filter placement 05/22/2019     Priority: Low   • HTN (hypertension) 05/22/2019     Priority: Low   • DM (diabetes mellitus) (CMD) 05/22/2019     Priority: Low   • S/P patent foramen ovale closure 11/03/2011     Priority: Low         Plan:          Jose Mercado MD  7/6/2023  11:41 AM       oral

## 2023-09-06 ENCOUNTER — EMERGENCY (EMERGENCY)
Facility: HOSPITAL | Age: 78
LOS: 1 days | Discharge: HOME | End: 2023-09-06
Attending: EMERGENCY MEDICINE | Admitting: EMERGENCY MEDICINE
Payer: MEDICARE

## 2023-09-06 VITALS
TEMPERATURE: 99 F | WEIGHT: 134.92 LBS | RESPIRATION RATE: 18 BRPM | DIASTOLIC BLOOD PRESSURE: 68 MMHG | SYSTOLIC BLOOD PRESSURE: 117 MMHG | OXYGEN SATURATION: 96 % | HEART RATE: 90 BPM

## 2023-09-06 VITALS
HEART RATE: 62 BPM | RESPIRATION RATE: 18 BRPM | DIASTOLIC BLOOD PRESSURE: 75 MMHG | SYSTOLIC BLOOD PRESSURE: 147 MMHG | OXYGEN SATURATION: 96 % | TEMPERATURE: 98 F

## 2023-09-06 DIAGNOSIS — M19.90 UNSPECIFIED OSTEOARTHRITIS, UNSPECIFIED SITE: ICD-10-CM

## 2023-09-06 DIAGNOSIS — L03.116 CELLULITIS OF LEFT LOWER LIMB: ICD-10-CM

## 2023-09-06 DIAGNOSIS — E78.5 HYPERLIPIDEMIA, UNSPECIFIED: ICD-10-CM

## 2023-09-06 DIAGNOSIS — Z86.69 PERSONAL HISTORY OF OTHER DISEASES OF THE NERVOUS SYSTEM AND SENSE ORGANS: ICD-10-CM

## 2023-09-06 DIAGNOSIS — Z79.82 LONG TERM (CURRENT) USE OF ASPIRIN: ICD-10-CM

## 2023-09-06 DIAGNOSIS — L53.8 OTHER SPECIFIED ERYTHEMATOUS CONDITIONS: ICD-10-CM

## 2023-09-06 DIAGNOSIS — Z87.81 PERSONAL HISTORY OF (HEALED) TRAUMATIC FRACTURE: ICD-10-CM

## 2023-09-06 LAB
ALBUMIN SERPL ELPH-MCNC: 3.7 G/DL — SIGNIFICANT CHANGE UP (ref 3.3–5)
ALP SERPL-CCNC: 68 U/L — SIGNIFICANT CHANGE UP (ref 40–120)
ALT FLD-CCNC: 19 U/L — SIGNIFICANT CHANGE UP (ref 10–45)
ANION GAP SERPL CALC-SCNC: 10 MMOL/L — SIGNIFICANT CHANGE UP (ref 5–17)
AST SERPL-CCNC: 18 U/L — SIGNIFICANT CHANGE UP (ref 10–40)
BASOPHILS # BLD AUTO: 0.04 K/UL — SIGNIFICANT CHANGE UP (ref 0–0.2)
BASOPHILS NFR BLD AUTO: 0.4 % — SIGNIFICANT CHANGE UP (ref 0–2)
BILIRUB SERPL-MCNC: 0.6 MG/DL — SIGNIFICANT CHANGE UP (ref 0.2–1.2)
BUN SERPL-MCNC: 26 MG/DL — HIGH (ref 7–23)
CALCIUM SERPL-MCNC: 9.4 MG/DL — SIGNIFICANT CHANGE UP (ref 8.4–10.5)
CHLORIDE SERPL-SCNC: 104 MMOL/L — SIGNIFICANT CHANGE UP (ref 96–108)
CO2 SERPL-SCNC: 28 MMOL/L — SIGNIFICANT CHANGE UP (ref 22–31)
CREAT SERPL-MCNC: 0.87 MG/DL — SIGNIFICANT CHANGE UP (ref 0.5–1.3)
EGFR: 67 ML/MIN/1.73M2 — SIGNIFICANT CHANGE UP
EOSINOPHIL # BLD AUTO: 0.16 K/UL — SIGNIFICANT CHANGE UP (ref 0–0.5)
EOSINOPHIL NFR BLD AUTO: 1.4 % — SIGNIFICANT CHANGE UP (ref 0–6)
GLUCOSE SERPL-MCNC: 108 MG/DL — HIGH (ref 70–99)
HCT VFR BLD CALC: 40.6 % — SIGNIFICANT CHANGE UP (ref 34.5–45)
HGB BLD-MCNC: 13.2 G/DL — SIGNIFICANT CHANGE UP (ref 11.5–15.5)
IMM GRANULOCYTES NFR BLD AUTO: 0.3 % — SIGNIFICANT CHANGE UP (ref 0–0.9)
LYMPHOCYTES # BLD AUTO: 1.15 K/UL — SIGNIFICANT CHANGE UP (ref 1–3.3)
LYMPHOCYTES # BLD AUTO: 10.3 % — LOW (ref 13–44)
MCHC RBC-ENTMCNC: 32.4 PG — SIGNIFICANT CHANGE UP (ref 27–34)
MCHC RBC-ENTMCNC: 32.5 GM/DL — SIGNIFICANT CHANGE UP (ref 32–36)
MCV RBC AUTO: 99.5 FL — SIGNIFICANT CHANGE UP (ref 80–100)
MONOCYTES # BLD AUTO: 0.63 K/UL — SIGNIFICANT CHANGE UP (ref 0–0.9)
MONOCYTES NFR BLD AUTO: 5.6 % — SIGNIFICANT CHANGE UP (ref 2–14)
NEUTROPHILS # BLD AUTO: 9.2 K/UL — HIGH (ref 1.8–7.4)
NEUTROPHILS NFR BLD AUTO: 82 % — HIGH (ref 43–77)
NRBC # BLD: 0 /100 WBCS — SIGNIFICANT CHANGE UP (ref 0–0)
PLATELET # BLD AUTO: 219 K/UL — SIGNIFICANT CHANGE UP (ref 150–400)
POTASSIUM SERPL-MCNC: 4.4 MMOL/L — SIGNIFICANT CHANGE UP (ref 3.5–5.3)
POTASSIUM SERPL-SCNC: 4.4 MMOL/L — SIGNIFICANT CHANGE UP (ref 3.5–5.3)
PROT SERPL-MCNC: 6.3 G/DL — SIGNIFICANT CHANGE UP (ref 6–8.3)
RBC # BLD: 4.08 M/UL — SIGNIFICANT CHANGE UP (ref 3.8–5.2)
RBC # FLD: 12.9 % — SIGNIFICANT CHANGE UP (ref 10.3–14.5)
SODIUM SERPL-SCNC: 142 MMOL/L — SIGNIFICANT CHANGE UP (ref 135–145)
WBC # BLD: 11.21 K/UL — HIGH (ref 3.8–10.5)
WBC # FLD AUTO: 11.21 K/UL — HIGH (ref 3.8–10.5)

## 2023-09-06 PROCEDURE — 99284 EMERGENCY DEPT VISIT MOD MDM: CPT | Mod: 25

## 2023-09-06 PROCEDURE — 99285 EMERGENCY DEPT VISIT HI MDM: CPT

## 2023-09-06 PROCEDURE — 96374 THER/PROPH/DIAG INJ IV PUSH: CPT

## 2023-09-06 PROCEDURE — 96375 TX/PRO/DX INJ NEW DRUG ADDON: CPT

## 2023-09-06 PROCEDURE — 87040 BLOOD CULTURE FOR BACTERIA: CPT

## 2023-09-06 PROCEDURE — 85025 COMPLETE CBC W/AUTO DIFF WBC: CPT

## 2023-09-06 PROCEDURE — 36415 COLL VENOUS BLD VENIPUNCTURE: CPT

## 2023-09-06 PROCEDURE — 80053 COMPREHEN METABOLIC PANEL: CPT

## 2023-09-06 RX ORDER — VANCOMYCIN HCL 1 G
1000 VIAL (EA) INTRAVENOUS ONCE
Refills: 0 | Status: COMPLETED | OUTPATIENT
Start: 2023-09-06 | End: 2023-09-06

## 2023-09-06 RX ORDER — SODIUM CHLORIDE 9 MG/ML
500 INJECTION INTRAMUSCULAR; INTRAVENOUS; SUBCUTANEOUS ONCE
Refills: 0 | Status: COMPLETED | OUTPATIENT
Start: 2023-09-06 | End: 2023-09-06

## 2023-09-06 RX ORDER — CEPHALEXIN 500 MG
1 CAPSULE ORAL
Qty: 15 | Refills: 0
Start: 2023-09-06 | End: 2023-09-10

## 2023-09-06 RX ORDER — PIPERACILLIN AND TAZOBACTAM 4; .5 G/20ML; G/20ML
3.38 INJECTION, POWDER, LYOPHILIZED, FOR SOLUTION INTRAVENOUS ONCE
Refills: 0 | Status: COMPLETED | OUTPATIENT
Start: 2023-09-06 | End: 2023-09-06

## 2023-09-06 RX ADMIN — SODIUM CHLORIDE 500 MILLILITER(S): 9 INJECTION INTRAMUSCULAR; INTRAVENOUS; SUBCUTANEOUS at 13:45

## 2023-09-06 RX ADMIN — Medication 250 MILLIGRAM(S): at 13:32

## 2023-09-06 RX ADMIN — PIPERACILLIN AND TAZOBACTAM 200 GRAM(S): 4; .5 INJECTION, POWDER, LYOPHILIZED, FOR SOLUTION INTRAVENOUS at 12:59

## 2023-09-06 NOTE — ED ADULT NURSE NOTE - NSFALLUNIVINTERV_ED_ALL_ED
Bed/Stretcher in lowest position, wheels locked, appropriate side rails in place/Call bell, personal items and telephone in reach/Instruct patient to call for assistance before getting out of bed/chair/stretcher/Non-slip footwear applied when patient is off stretcher/[place] to call system/Physically safe environment - no spills, clutter or unnecessary equipment/Purposeful proactive rounding/Room/bathroom lighting operational, light cord in reach

## 2023-09-06 NOTE — ED ADULT NURSE NOTE - OBJECTIVE STATEMENT
pt presents to ER c/o continued LLE redness and swelling around wound that was sustained three weeks ago. pt states she took full course of abx and was sent by urgent care for evaluation. denies pain to site or fevers.

## 2023-09-06 NOTE — ED PROVIDER NOTE - CLINICAL SUMMARY MEDICAL DECISION MAKING FREE TEXT BOX
78-year-old female with past medical history HLD, glaucoma, osteoarthritis, femur fracture s/p surgery 3 years ago and walks with a cane secondary to this, lives independently at home, presents today for evaluation of wound to her left shin. Approximately 3 weeks ago patient scraped her left shin while getting onto the bus with the bleeding from the area.  [facility] and was advised to put antibiotic ointment on the affected area, which she did for a couple of days.  Noted redness and swelling around the wound site and went back to [facility] where she was prescribed 5 days of Keflex which she completed 2 days ago.  Went back for reevaluation to [facility] yesterday and was noted to have persistent redness and swelling and was advised to come to the ED for further evaluation.  Patient notes that the erythema and swelling have improved since the antibiotic treatment.  Denies fevers or chills.  Denies discharge or fluctuance to the area. No other complaints.    ED course: Vital signs noted.  Patient afebrile and rest of vital signs are within normal limits.  Exam as above.  Will obtain blood work.  Given IV dose of Zosyn and vancomycin.  Patient to be discharged on Keflex and Bactrim.  Nontoxic-appearing and stable for discharge. 78-year-old female with past medical history HLD, glaucoma, osteoarthritis, femur fracture s/p surgery 3 years ago and walks with a cane secondary to this, lives independently at home, presents today for evaluation of wound to her left shin. Approximately 3 weeks ago patient scraped her left shin while getting onto the bus with the bleeding from the area.  [facility] and was advised to put antibiotic ointment on the affected area, which she did for a couple of days.  Noted redness and swelling around the wound site and went [facility] to [facility] where she was prescribed 5 days of Keflex which she completed 2 days ago.  Went back for reevaluation to [facility] yesterday and was noted to have persistent redness and swelling and was advised to come to the ED for further evaluation.  Patient notes that the erythema and swelling have improved since the antibiotic treatment.  Denies fevers or chills.  Denies discharge or fluctuance to the area. No other complaints.    ED course: Vital signs noted.  Patient afebrile and rest of vital signs are within normal limits.  Exam as above.  Labs noted and with mild elevation of WBC (11.2) with shift. Rest of labs unremarkable. Given IV dose of Zosyn and vancomycin. Patient to be discharged on 5 more days of Keflex and 10 days of Bactrim.  Nontoxic-appearing and stable for discharge. To follow up outpatient. Strict return precautions given.

## 2023-09-06 NOTE — ED ADULT TRIAGE NOTE - CHIEF COMPLAINT QUOTE
[facility] for [facility] check. Pt has abrasion on the left shin (s/p scraping leg on a bus 8/15). pt took full course of Keflex. pt c/o swelling to the left leg and redness around surround skin.

## 2023-09-06 NOTE — ED PROVIDER NOTE - PATIENT PORTAL LINK FT
You can access the FollowMyHealth Patient Portal offered by [facility] by registering at the following website: [email or web address]. By joining [employer]’s FollowMyHealth portal, you will also be able to view your health information using other applications (apps) compatible with our system.

## 2023-09-06 NOTE — ED PROVIDER NOTE - NSFOLLOWUPINSTRUCTIONS_ED_ALL_ED_FT
Please follow up with your primary care doctor in 2-3 days for a wound check. Return to the ER if you develop any concerning symptoms.    Cellulitis    Cellulitis is a skin infection caused by bacteria. This condition occurs most often in the arms and lower legs but can occur anywhere over the body. Symptoms include redness, swelling, warm skin, tenderness, and chills/fever. If you were prescribed an antibiotic medicine, take it as told by your health care provider. Do not stop taking the antibiotic even if you start to feel better.    SEEK IMMEDIATE MEDICAL CARE IF YOU HAVE ANY OF THE FOLLOWING SYMPTOMS: worsening fever, red streaks coming from affected area, vomiting or diarrhea, or dizziness/lightheadedness.

## 2023-09-06 NOTE — ED PROVIDER NOTE - BIRTH SEX
Mother asked to see lactation consultants regarding pumping and feeding her breast milk since she is now leaking and has decided to switch from feeding all formula by bottle to offering EBM by bottle. Lactation visit 1015 to 1036 - 16 minutes.    Mom will feed pumped breast milk to infant with bottle  Education provided:  Frequency of pumping  Breast pump information given  Educated on pumping and storing of breast milk    Mother also given script to have doctor sign to obtain pump and locations to get a pump. Also taught mother how to hand pump for home use until she obtains an electric pump.     Mother to call with further questions or concerns.  
This note was copied from a baby's chart.  BREASTFEEDING SUPPORT SERVICES NOTE    Time spent with mother/baby: 16 minutes.    Breastfeeding Support Staff have met with the patient/family and the following services have been provided:  Introduced to breastfeeding services    Proper positioning at the breast/latch on  Importance of Skin to Skin contact  Encouraged to look at videos/online breast feeding information  Evaluated medications - labetalol L2 probably compatible, magnesium sulfate L1 compatible, per Dr. Kingston book \"Medications and Mother's Milk.\"    Reviewed the above with the mother. Mother was not very receptive during the visit. Mother plans to pump and feed, and give formula. When the writer walked in the room, the RN said the mother was willing to put baby to breast at this feeding. Infant was placed skin to skin with mother in the cross cradle hold. Mom/baby were supported with pillows. Mother complained the weight of the baby was too much for her. Assisted with positioning in the football hold. Offered assistance with latching, and mother agreed. Mother attempted to latch for 2 minutes and stated she is going to give the bottle instead. Mother stated she would just prefer to give bottles. Support given.  Offered to review the breast pump,and mother agreed. Mother initiated pumping at 1020 today. Bette was concerned that she didn't see milk when she pumped. Reviewed pump use and normal milk volumes. Encouraged pumping at least 8-10 times a day for 10-15 minutes. Fit with a 24 mm flange. Was given milk storage information. Offered to assist with pumping, and mother declined at this time.  Unsure of commitment with pumping at this time.   Feeding attempt reported to bedside RN.     Lactation will continue to follow.      
This note was copied from a baby's chart.  Lactation visit - 1253 to 1255 - 2 minutes    Went to see mother to inquire how pumping was going and to see if she had any questions or concerns. Mother said she has now decided to strictly formula feed and does not wish to pump. Support given for decision.     Mother will notify nurse if she changes her mind about her feeding choice and if desires any assistance.  
Female

## 2023-09-06 NOTE — ED PROVIDER NOTE - OBJECTIVE STATEMENT
78-year-old female with past medical history HLD, glaucoma, osteoarthritis, femur fracture s/p surgery 3 years ago and walks with a cane secondary to this, lives independently at home, presents today for evaluation of wound to her left shin. Approximately 3 weeks ago patient scraped her left shin while getting onto the bus with the bleeding from the area.  [facility] and was advised to put antibiotic ointment on the affected area, which she did for a couple of days.  Noted redness and swelling around the wound site and went back to [facility] where she was prescribed 5 days of Keflex which she completed 2 days ago.  Went back for reevaluation to [facility] yesterday and was noted to have persistent redness and swelling and was advised to come to the ED for further evaluation.  Patient notes that the erythema and swelling have improved since the antibiotic treatment.  Denies fevers or chills.  Denies discharge or fluctuance to the area. No other complaints.

## 2023-09-06 NOTE — ED PROVIDER NOTE - SKIN, MLM
Healed abrasion noted to left shin with surrounding erythema and mild warmth. No discharge. No calf swelling or TTP.

## 2023-09-11 LAB
CULTURE RESULTS: SIGNIFICANT CHANGE UP
CULTURE RESULTS: SIGNIFICANT CHANGE UP
SPECIMEN SOURCE: SIGNIFICANT CHANGE UP
SPECIMEN SOURCE: SIGNIFICANT CHANGE UP

## 2023-11-08 ENCOUNTER — APPOINTMENT (OUTPATIENT)
Dept: OPHTHALMOLOGY | Facility: CLINIC | Age: 78
End: 2023-11-08
Payer: MEDICARE

## 2023-11-08 ENCOUNTER — NON-APPOINTMENT (OUTPATIENT)
Age: 78
End: 2023-11-08

## 2023-11-08 PROCEDURE — 76514 ECHO EXAM OF EYE THICKNESS: CPT

## 2023-11-08 PROCEDURE — 92133 CPTRZD OPH DX IMG PST SGM ON: CPT

## 2023-11-08 PROCEDURE — 92083 EXTENDED VISUAL FIELD XM: CPT

## 2023-11-08 PROCEDURE — 92012 INTRM OPH EXAM EST PATIENT: CPT

## 2023-11-09 ENCOUNTER — APPOINTMENT (OUTPATIENT)
Dept: OPHTHALMOLOGY | Facility: CLINIC | Age: 78
End: 2023-11-09

## 2023-11-30 ENCOUNTER — EMERGENCY (EMERGENCY)
Facility: HOSPITAL | Age: 78
LOS: 1 days | Discharge: HOME | End: 2023-11-30
Attending: EMERGENCY MEDICINE | Admitting: EMERGENCY MEDICINE
Payer: MEDICARE

## 2023-11-30 VITALS
OXYGEN SATURATION: 94 % | TEMPERATURE: 99 F | RESPIRATION RATE: 16 BRPM | HEART RATE: 109 BPM | SYSTOLIC BLOOD PRESSURE: 139 MMHG | DIASTOLIC BLOOD PRESSURE: 84 MMHG | WEIGHT: 134.92 LBS | HEIGHT: 61 IN

## 2023-11-30 VITALS
SYSTOLIC BLOOD PRESSURE: 129 MMHG | OXYGEN SATURATION: 95 % | RESPIRATION RATE: 18 BRPM | DIASTOLIC BLOOD PRESSURE: 81 MMHG | TEMPERATURE: 98 F | HEART RATE: 77 BPM

## 2023-11-30 DIAGNOSIS — Z87.81 PERSONAL HISTORY OF (HEALED) TRAUMATIC FRACTURE: ICD-10-CM

## 2023-11-30 DIAGNOSIS — S81.812A LACERATION WITHOUT FOREIGN BODY, LEFT LOWER LEG, INITIAL ENCOUNTER: ICD-10-CM

## 2023-11-30 DIAGNOSIS — L03.116 CELLULITIS OF LEFT LOWER LIMB: ICD-10-CM

## 2023-11-30 DIAGNOSIS — L53.9 ERYTHEMATOUS CONDITION, UNSPECIFIED: ICD-10-CM

## 2023-11-30 DIAGNOSIS — I10 ESSENTIAL (PRIMARY) HYPERTENSION: ICD-10-CM
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
## 2023-11-30 DIAGNOSIS — E78.5 HYPERLIPIDEMIA, UNSPECIFIED: ICD-10-CM

## 2023-11-30 DIAGNOSIS — M19.90 UNSPECIFIED OSTEOARTHRITIS, UNSPECIFIED SITE: ICD-10-CM
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
## 2023-11-30 DIAGNOSIS — Z86.69 PERSONAL HISTORY OF OTHER DISEASES OF THE NERVOUS SYSTEM AND SENSE ORGANS: ICD-10-CM

## 2023-11-30 LAB
ALBUMIN SERPL ELPH-MCNC: 3.8 G/DL — SIGNIFICANT CHANGE UP (ref 3.3–5)
ALBUMIN SERPL ELPH-MCNC: 3.8 G/DL — SIGNIFICANT CHANGE UP (ref 3.3–5)
ALP SERPL-CCNC: 76 U/L — SIGNIFICANT CHANGE UP (ref 40–120)
ALP SERPL-CCNC: 76 U/L — SIGNIFICANT CHANGE UP (ref 40–120)
ALT FLD-CCNC: 21 U/L — SIGNIFICANT CHANGE UP (ref 10–45)
ALT FLD-CCNC: 21 U/L — SIGNIFICANT CHANGE UP (ref 10–45)
ANION GAP SERPL CALC-SCNC: 12 MMOL/L — SIGNIFICANT CHANGE UP (ref 5–17)
ANION GAP SERPL CALC-SCNC: 12 MMOL/L — SIGNIFICANT CHANGE UP (ref 5–17)
AST SERPL-CCNC: 15 U/L — SIGNIFICANT CHANGE UP (ref 10–40)
AST SERPL-CCNC: 15 U/L — SIGNIFICANT CHANGE UP (ref 10–40)
BASOPHILS # BLD AUTO: 0.01 K/UL — SIGNIFICANT CHANGE UP (ref 0–0.2)
BASOPHILS # BLD AUTO: 0.01 K/UL — SIGNIFICANT CHANGE UP (ref 0–0.2)
BASOPHILS NFR BLD AUTO: 0.2 % — SIGNIFICANT CHANGE UP (ref 0–2)
BASOPHILS NFR BLD AUTO: 0.2 % — SIGNIFICANT CHANGE UP (ref 0–2)
BILIRUB SERPL-MCNC: 0.6 MG/DL — SIGNIFICANT CHANGE UP (ref 0.2–1.2)
BILIRUB SERPL-MCNC: 0.6 MG/DL — SIGNIFICANT CHANGE UP (ref 0.2–1.2)
BUN SERPL-MCNC: 31 MG/DL — HIGH (ref 7–23)
BUN SERPL-MCNC: 31 MG/DL — HIGH (ref 7–23)
CALCIUM SERPL-MCNC: 9.4 MG/DL — SIGNIFICANT CHANGE UP (ref 8.4–10.5)
CALCIUM SERPL-MCNC: 9.4 MG/DL — SIGNIFICANT CHANGE UP (ref 8.4–10.5)
CHLORIDE SERPL-SCNC: 106 MMOL/L — SIGNIFICANT CHANGE UP (ref 96–108)
CHLORIDE SERPL-SCNC: 106 MMOL/L — SIGNIFICANT CHANGE UP (ref 96–108)
CO2 SERPL-SCNC: 27 MMOL/L — SIGNIFICANT CHANGE UP (ref 22–31)
CO2 SERPL-SCNC: 27 MMOL/L — SIGNIFICANT CHANGE UP (ref 22–31)
CREAT SERPL-MCNC: 0.96 MG/DL — SIGNIFICANT CHANGE UP (ref 0.5–1.3)
CREAT SERPL-MCNC: 0.96 MG/DL — SIGNIFICANT CHANGE UP (ref 0.5–1.3)
EGFR: 60 ML/MIN/1.73M2 — SIGNIFICANT CHANGE UP
EGFR: 60 ML/MIN/1.73M2 — SIGNIFICANT CHANGE UP
EOSINOPHIL # BLD AUTO: 0.01 K/UL — SIGNIFICANT CHANGE UP (ref 0–0.5)
EOSINOPHIL # BLD AUTO: 0.01 K/UL — SIGNIFICANT CHANGE UP (ref 0–0.5)
EOSINOPHIL NFR BLD AUTO: 0.2 % — SIGNIFICANT CHANGE UP (ref 0–6)
EOSINOPHIL NFR BLD AUTO: 0.2 % — SIGNIFICANT CHANGE UP (ref 0–6)
GLUCOSE SERPL-MCNC: 148 MG/DL — HIGH (ref 70–99)
GLUCOSE SERPL-MCNC: 148 MG/DL — HIGH (ref 70–99)
HCT VFR BLD CALC: 40.3 % — SIGNIFICANT CHANGE UP (ref 34.5–45)
HCT VFR BLD CALC: 40.3 % — SIGNIFICANT CHANGE UP (ref 34.5–45)
HGB BLD-MCNC: 14.1 G/DL — SIGNIFICANT CHANGE UP (ref 11.5–15.5)
HGB BLD-MCNC: 14.1 G/DL — SIGNIFICANT CHANGE UP (ref 11.5–15.5)
IMM GRANULOCYTES NFR BLD AUTO: 0.5 % — SIGNIFICANT CHANGE UP (ref 0–0.9)
IMM GRANULOCYTES NFR BLD AUTO: 0.5 % — SIGNIFICANT CHANGE UP (ref 0–0.9)
LYMPHOCYTES # BLD AUTO: 0.95 K/UL — LOW (ref 1–3.3)
LYMPHOCYTES # BLD AUTO: 0.95 K/UL — LOW (ref 1–3.3)
LYMPHOCYTES # BLD AUTO: 14.4 % — SIGNIFICANT CHANGE UP (ref 13–44)
LYMPHOCYTES # BLD AUTO: 14.4 % — SIGNIFICANT CHANGE UP (ref 13–44)
MCHC RBC-ENTMCNC: 32.5 PG — SIGNIFICANT CHANGE UP (ref 27–34)
MCHC RBC-ENTMCNC: 32.5 PG — SIGNIFICANT CHANGE UP (ref 27–34)
MCHC RBC-ENTMCNC: 35 GM/DL — SIGNIFICANT CHANGE UP (ref 32–36)
MCHC RBC-ENTMCNC: 35 GM/DL — SIGNIFICANT CHANGE UP (ref 32–36)
MCV RBC AUTO: 92.9 FL — SIGNIFICANT CHANGE UP (ref 80–100)
MCV RBC AUTO: 92.9 FL — SIGNIFICANT CHANGE UP (ref 80–100)
MONOCYTES # BLD AUTO: 0.59 K/UL — SIGNIFICANT CHANGE UP (ref 0–0.9)
MONOCYTES # BLD AUTO: 0.59 K/UL — SIGNIFICANT CHANGE UP (ref 0–0.9)
MONOCYTES NFR BLD AUTO: 8.9 % — SIGNIFICANT CHANGE UP (ref 2–14)
MONOCYTES NFR BLD AUTO: 8.9 % — SIGNIFICANT CHANGE UP (ref 2–14)
NEUTROPHILS # BLD AUTO: 5.02 K/UL — SIGNIFICANT CHANGE UP (ref 1.8–7.4)
NEUTROPHILS # BLD AUTO: 5.02 K/UL — SIGNIFICANT CHANGE UP (ref 1.8–7.4)
NEUTROPHILS NFR BLD AUTO: 75.8 % — SIGNIFICANT CHANGE UP (ref 43–77)
NEUTROPHILS NFR BLD AUTO: 75.8 % — SIGNIFICANT CHANGE UP (ref 43–77)
NRBC # BLD: 0 /100 WBCS — SIGNIFICANT CHANGE UP (ref 0–0)
NRBC # BLD: 0 /100 WBCS — SIGNIFICANT CHANGE UP (ref 0–0)
PLATELET # BLD AUTO: 179 K/UL — SIGNIFICANT CHANGE UP (ref 150–400)
PLATELET # BLD AUTO: 179 K/UL — SIGNIFICANT CHANGE UP (ref 150–400)
POTASSIUM SERPL-MCNC: 4 MMOL/L — SIGNIFICANT CHANGE UP (ref 3.5–5.3)
POTASSIUM SERPL-MCNC: 4 MMOL/L — SIGNIFICANT CHANGE UP (ref 3.5–5.3)
POTASSIUM SERPL-SCNC: 4 MMOL/L — SIGNIFICANT CHANGE UP (ref 3.5–5.3)
POTASSIUM SERPL-SCNC: 4 MMOL/L — SIGNIFICANT CHANGE UP (ref 3.5–5.3)
PROT SERPL-MCNC: 6.5 G/DL — SIGNIFICANT CHANGE UP (ref 6–8.3)
PROT SERPL-MCNC: 6.5 G/DL — SIGNIFICANT CHANGE UP (ref 6–8.3)
RBC # BLD: 4.34 M/UL — SIGNIFICANT CHANGE UP (ref 3.8–5.2)
RBC # BLD: 4.34 M/UL — SIGNIFICANT CHANGE UP (ref 3.8–5.2)
RBC # FLD: 12.3 % — SIGNIFICANT CHANGE UP (ref 10.3–14.5)
RBC # FLD: 12.3 % — SIGNIFICANT CHANGE UP (ref 10.3–14.5)
SODIUM SERPL-SCNC: 145 MMOL/L — SIGNIFICANT CHANGE UP (ref 135–145)
SODIUM SERPL-SCNC: 145 MMOL/L — SIGNIFICANT CHANGE UP (ref 135–145)
WBC # BLD: 6.61 K/UL — SIGNIFICANT CHANGE UP (ref 3.8–10.5)
WBC # BLD: 6.61 K/UL — SIGNIFICANT CHANGE UP (ref 3.8–10.5)
WBC # FLD AUTO: 6.61 K/UL — SIGNIFICANT CHANGE UP (ref 3.8–10.5)
WBC # FLD AUTO: 6.61 K/UL — SIGNIFICANT CHANGE UP (ref 3.8–10.5)

## 2023-11-30 PROCEDURE — 36415 COLL VENOUS BLD VENIPUNCTURE: CPT

## 2023-11-30 PROCEDURE — 93971 EXTREMITY STUDY: CPT

## 2023-11-30 PROCEDURE — 80053 COMPREHEN METABOLIC PANEL: CPT

## 2023-11-30 PROCEDURE — 99284 EMERGENCY DEPT VISIT MOD MDM: CPT

## 2023-11-30 PROCEDURE — 85025 COMPLETE CBC W/AUTO DIFF WBC: CPT

## 2023-11-30 PROCEDURE — 99284 EMERGENCY DEPT VISIT MOD MDM: CPT | Mod: 25

## 2023-11-30 PROCEDURE — 93971 EXTREMITY STUDY: CPT | Mod: 26,LT

## 2023-11-30 RX ORDER — ATORVASTATIN CALCIUM 80 MG/1
1 TABLET, FILM COATED ORAL
Qty: 0 | Refills: 0 | DISCHARGE

## 2023-11-30 RX ORDER — TRIAMTERENE/HYDROCHLOROTHIAZID 75 MG-50MG
1 TABLET ORAL
Qty: 0 | Refills: 0 | DISCHARGE

## 2023-11-30 RX ORDER — RAMIPRIL 5 MG
1 CAPSULE ORAL
Qty: 0 | Refills: 0 | DISCHARGE

## 2023-11-30 RX ORDER — TRAZODONE HCL 50 MG
1 TABLET ORAL
Qty: 0 | Refills: 0 | DISCHARGE

## 2023-11-30 RX ORDER — LATANOPROST 0.05 MG/ML
1 SOLUTION/ DROPS OPHTHALMIC; TOPICAL
Qty: 0 | Refills: 0 | DISCHARGE

## 2023-11-30 RX ORDER — ESCITALOPRAM OXALATE 10 MG/1
0 TABLET, FILM COATED ORAL
Refills: 0 | DISCHARGE

## 2023-11-30 NOTE — ED ADULT NURSE NOTE - NSFALLUNIVINTERV_ED_ALL_ED
Bed/Stretcher in lowest position, wheels locked, appropriate side rails in place/Call bell, personal items and telephone in reach/Instruct patient to call for assistance before getting out of bed/chair/stretcher/Non-slip footwear applied when patient is off stretcher/[place] to call system/Physically safe environment - no spills, clutter or unnecessary equipment/Purposeful proactive rounding/Room/bathroom lighting operational, light cord in reach

## 2023-11-30 NOTE — ED PROVIDER NOTE - CLINICAL SUMMARY MEDICAL DECISION MAKING FREE TEXT BOX
Pt   Complaining of left leg redness and swelling.  Exam with possible cellulitis, likely secondary to patient's dry flaking skin versus swelling related to DVT.  Overall low concern for DVT.  CBC and chemistry within normal limits.  Patient ordered for lower extremity Doppler.  Plan for antibiotics if Doppler negative for cellulitis.  Patient also with skin tear of left shin.  Xeroform and Tegaderm dressing placed.  Discussed signs and symptoms of worsening infection.  Also discussed that shin wounds can heal poorly and that patient should follow closely with her primary care doctor.  See progress notes for further [facility] related documentation.

## 2023-11-30 NOTE — ED PROVIDER NOTE - CARE PLAN
1 Principal Discharge DX:	Cellulitis, leg  Secondary Diagnosis:	Noninfected skin tear of left leg, initial encounter

## 2023-11-30 NOTE — ED PROVIDER NOTE - PATIENT PORTAL LINK FT
You can access the FollowMyHealth Patient Portal offered by [facility] by registering at the following website: [email or web address]. By joining [employer]’s FollowMyHealth portal, you will also be able to view your health information using other applications (apps) compatible with our system.

## 2023-11-30 NOTE — ED PROVIDER NOTE - NSFOLLOWUPINSTRUCTIONS_ED_ALL_ED_FT
Cellulitis of L leg  Skin tear of L leg    Take doxycycline twice a day for 1 week.  Elevate your leg.     Keep wound clean and dry.  Use antibiotic ointment on wound 3 times a day.  Return for increased pain, redness/swelling/drainage from wound, worsening leg swelling or pain, fever, any other concerns.     Follow up with your pmd.    Cellulitis    Cellulitis is a skin infection caused by bacteria. This condition occurs most often in the arms and lower legs but can occur anywhere over the body. Symptoms include redness, swelling, warm skin, tenderness, and chills/fever. If you were prescribed an antibiotic medicine, take it as told by your health care provider. Do not stop taking the antibiotic even if you start to feel better.    SEEK IMMEDIATE MEDICAL CARE IF YOU HAVE ANY OF THE FOLLOWING SYMPTOMS: worsening fever, red streaks coming from affected area, vomiting or diarrhea, or dizziness/lightheadedness.    --  Skin Tear    AMBULATORY CARE:    A skin tear occurs when the layers of weakened skin split open from an injury. It is important to treat and prevent skin tears to prevent infection.    Call your doctor if:    You have a fever or chills.    Blood soaks through your bandage.    You have redness, swelling, pus, or a bad odor coming from your wound.    You have severe pain.    Your wound tears open again.    You have questions or concerns about your condition or care.  Treatment may include any of the following:    Medicines may be given to decrease pain or treat a bacterial infection.    Bandages such as moist gauze pads or wraps may be placed on your wound. Bandages will help protect your wound from more injury, and allow your wound to heal. Do not use adhesive bandages. These could stick to your wound and make your skin tear worse.    Stitches or medical glue may be used to close the wound so it can heal.    Debridement is removal of dead, damaged, or infected skin to help your wound heal correctly.  Prevent a skin tear:    Clean, moisturize, and protect your skin. Baths, hot showers, and soap can dry your skin and increase your risk for skin tears. Take lukewarm showers, use mild soap as directed, and gently pat your skin dry. Use lotion to keep your skin moist after you shower. Wear long sleeves, pants, and protective footwear.    Move carefully. Ask for help if you cannot lift yourself. Do not drag your skin when you move.    Keep your home safe. Cover sharp corners, keep your pathways clear, and turn on lights so you can see clearly. Ask for more information if you have questions about home safety.    Drink liquids as directed. Ask your provider how much liquid to drink each day and which liquids are best for you. Liquids will help keep your skin moist and protected from another skin tear.    Eat high-protein foods to help with wound healing. Examples are lean meats, fish, low-fat dairy products, and beans.

## 2023-11-30 NOTE — ED ADULT NURSE NOTE - OBJECTIVE STATEMENT
Pt A&Ox3 and able to speak in complete sentences. pt has left knee and ankle swelling. Pt endorsed concern regarding left foot wound in which pt enodrsed has pink erythema around wound site. pt denies sob, n, v, lightheadedness, dizziness, sob, fevers, chills.

## 2023-11-30 NOTE — ED PROVIDER NOTE - OBJECTIVE STATEMENT
77 yo F h/o HLD, glaucoma, osteoarthritis, femur fracture s/p surgery 3 years ago and walks with a cane 79 yo F h/o HLD, HTN, glaucoma, osteoarthritis, femur fracture s/p surgery 3 years ago and walks with a cane c/o Left leg redness and swelling  that she noted since last night.  Patient states she scraped her leg while getting out of the cab earlier today but that the symptoms on her left leg were present prior to today's incident.  No fever.  Patient notes mild discomfort.  Patient notes chronic swelling of her left knee that is unchanged from her baseline.  Patient had some travel to [place] in the car and train recently.  No history of DVT.  No chest pain or shortness of breath.   No history of similar symptoms in her leg.

## 2023-11-30 NOTE — ED PROVIDER NOTE - PHYSICAL EXAMINATION
VITAL SIGNS: I have reviewed nursing notes and confirm.  CONSTITUTIONAL:  in no acute distress.   SKIN:  warm and dry, no acute rash.   HEAD:  normocephalic, atraumatic.  EYES: PERRL, EOM intact; conjunctiva and sclera clear.  ENT: No nasal discharge; airway clear.   NECK: Supple; non tender.  CARD: S1, S2 normal; no murmurs, gallops, or rubs. Regular rate and rhythm.   RESP:  Clear to auscultation b/l, no wheezes, rales or rhonchi.  ABD: Normal bowel sounds; soft; non-distended; non-tender; no guarding/ rebound.  MSK: Normal ROM. No clubbing, cyanosis LLE - dp 1+, + swelling w/o warmth, erythema L knee (at baseline per pt), knee w/o ttp, + from; lower leg w dry, flaking skin, mild erythema and warmth w/o sig ttp ant shin and lateral dorsum of foot, + mild calf ttp, skin tear 4 x 5 cm ant lower shin, mild swelling of ankle and foot  NEURO: Alert, oriented, grossly unremarkable  PSYCH: Cooperative, mood and affect appropriate.

## 2024-05-06 ENCOUNTER — APPOINTMENT (OUTPATIENT)
Dept: OPHTHALMOLOGY | Facility: CLINIC | Age: 79
End: 2024-05-06
Payer: MEDICARE

## 2024-05-06 ENCOUNTER — NON-APPOINTMENT (OUTPATIENT)
Age: 79
End: 2024-05-06

## 2024-05-06 PROBLEM — M19.90 UNSPECIFIED OSTEOARTHRITIS, UNSPECIFIED SITE: Chronic | Status: ACTIVE | Noted: 2023-11-30

## 2024-05-06 PROCEDURE — 92083 EXTENDED VISUAL FIELD XM: CPT

## 2024-05-06 PROCEDURE — 92012 INTRM OPH EXAM EST PATIENT: CPT

## 2024-05-06 PROCEDURE — 92250 FUNDUS PHOTOGRAPHY W/I&R: CPT

## 2024-05-21 ENCOUNTER — APPOINTMENT (OUTPATIENT)
Dept: OPHTHALMOLOGY | Facility: CLINIC | Age: 79
End: 2024-05-21
Payer: MEDICARE

## 2024-05-21 ENCOUNTER — NON-APPOINTMENT (OUTPATIENT)
Age: 79
End: 2024-05-21

## 2024-05-21 PROCEDURE — 92012 INTRM OPH EXAM EST PATIENT: CPT

## 2024-06-24 ENCOUNTER — APPOINTMENT (OUTPATIENT)
Dept: OPHTHALMOLOGY | Facility: CLINIC | Age: 79
End: 2024-06-24

## 2024-06-24 PROCEDURE — 92083 EXTENDED VISUAL FIELD XM: CPT

## 2024-06-24 PROCEDURE — 92012 INTRM OPH EXAM EST PATIENT: CPT

## 2024-08-28 NOTE — ED PROVIDER NOTE - CPE EDP RESP NORM
Continued Stay SW/CM Assessment/Plan of Care Note       ADDENDUM 1440  SW received call from facility staff Esther [phone] ext [phone], requesting MAR for pt. JACLYN asked how many days MAR facility wanted; Esther stated \"a couple days.\" JACLYN faxed 3-day MAR to Esther.     JACLYN called Priya [phone] who confirmed received the authorization document stating insurance authorized SANDIE stay at [facility]. She stated she is awaiting Esther's review of pt MAR prior to confirming when facility can accept pt.     ADDENDUM 1327  Updated by [facility] Elsa that insurance authorization obtained. JACLYN called admissions Priya [phone] at [facility]; provided update. Per Priya, JACLYN to call another facility admissions staff Priya [phone] to coordinate dc to [facility]. JACLYN called Priya [phone]; left two voicemails. JACLYN called Priya [phone]; left voicemail that SW has not been updated by facility admissions. Requested callback.     Progress note:  Patient's plan of care reviewed during MDRs, and JACLYN completed chart review. SW following for SANDIE placement; pt is medically ready for discharge.     Pt was medically accepted to [facility]. [facility] requested a peer to peer to completed by 1200 today. Secure chat sent to MD with peer to peer information. MD completed peer to peer, and [facility] denied pt stay at [facility].     JACLYN updated pt. Informed pt right to appeal denial; also discussed that [facility] can accept pt for SANDIE. Pt does not wish to appeal; wishes to pursue rehab placement at [facility]. Pt also asked about returning home; JACLYN discussed team's recommendations for SANDIE for safety/for pt to regain strength prior to returning home. Pt agreeable to SANDIE at this time.     JACLYN spoke with admissions Priya [phone] at [facility]. Priya confirmed CAN accept pt. Priya confirmed [facility] can submit for insurance authorization, and facility can accept once auth received. JACLYN sent  secure chat to [facility] Elsa who confirmed insurance authorization submitted.     RN report #[id number]. Pharmacy: [facility] in [place].     SW will continue to follow. See SW/CM flowsheets for other objective data.    Disposition Recommendations:  Preliminary discharge destination: Planned Discharge Destination: Rehabilitation/Skilled Care  SW/CM recommendation for discharge: Other (comment) (RODRÍGUEZ)    Destination Pharmacy: [facility] - [place], [place] - [address] [facility] Pharmacy confirmed with facility    Discharge Plan/Needs:     Continued Care and Services - Admitted Since 7/20/2024       Destination  Coordination complete.      Service Provider Request Status Selected Services Address Phone Fax    [facility] [address] - IP  Selected Inpatient Rehabilitation [address] [facility] [address] [phone]    [facility] - SNF Accepted -- [address] [facility] [id number] [phone]    [facility] Pending - Request Sent -- [address], [facility] [address] [phone] --    CARE A[facility] OF [facility] Pending - Request Sent -- [address] [facility] [facility] [place] [id number] [phone]    [facility] Pending - Request Sent -- [id number], [facility] [id number] [phone]    [facility] - SNF Pending - Request Sent -- [address] [facility] [address] [phone]    [facility] Pending - Request Sent -- [address] [facility] [address] [phone]    [facility] Pending - Request Sent -- [address] [facility] [address] [phone]    THE [facility]HAB SNF Pending - Request Sent -- [address] [facility] [address] [phone]  [phone]    [facility] - SNF Pending - Request Sent -- [address] [facility] [address] [phone]    [facility] SNF Pending - Request Sent -- [address] [facility] [address] [phone]    [facility] - SNF Pending - Request Sent -- [address] [facility] [address] [phone]    [facility] SNF Pending - Request Sent -- [address] [facility] [address] [phone]    [facility] Pending - Request Sent -- [address] [facility] [address] [phone]    [facility] - Request Sent -- [address] [place] [address] [phone]    [facility] - SNF PAN Pending - Request Sent -- [address] [place] [place] [address] [phone]    [facility] SNF PAN Pending - Request Sent -- [address] [facility] [address] [phone]    [facility] SNF PAN Pending - Request Sent -- [address] [place] [address] [phone]    [facility] SNF Pending - Request Sent -- [address] [address] [phone]    THE [facility] - SNF Pending - Request Sent -- [address] [place] [place] [address] [phone]    THE [facility] SNF Pending - Request Sent -- [address] [place] [address] [phone]    [facility] SNF Declined  Acuity too high -- [address] [place] [address] [phone]    [facility] - PAN SNF Declined  Bed not available -- [address]  [facility] [address], [facility] [address] [phone]    [facility] [place] [facility] SNF Declined  Acuity too high -- [address] [facility] [address]  [phone]    [facility] - PAN SNF Declined  no appropriate bed available -- [id number] [place] [address] [phone]    [facility] Declined  cannot meet his care needs -- [address] [place] [address] [phone]    [facility] Declined  Acuity too high -- [address] [address] [phone]    [facility] Declined  Acuity too high -- [address] [facility] [address] [phone]    THE MEDICAL SUITES AT [facility] Declined  Acuity too high -- [address] [facility] [address] [phone]    [facility] Declined  Acuity too high -- [address] [facility] [facility] [address] [phone]    [facility] Declined  Acuity too high -- [address] [facility] [address] [phone]    [facility] Declined  No contract with patient's insurance carrier -- [address] [facility] [address] [phone]    [facility] Declined  Acuity too high -- [address] [facility] [address] [phone]    [facility] Declined  Acuity too high -- [address] [facility] [address] [phone]                    Prior To Hospitalization:    Living Situation: Alone and residing at Supported independent    .  Support Systems: Family members   Home Devices/Equipment: None            Mobility Assist Devices: None   Type of Service Prior to Hospitalization: None               Patient/Family discharge goal (s):  Supportive independent living     Resources provided:           Prior Function  Bed Mobility: Modified Independent (07/27/24 1005 : Maryellen Quiros, PT)  Transfers: Modified Independent  (07/27/24 1005 : Maryellen Quiros, PT)  Ambulation in the Home: Modified  Independent (07/27/24 1005 : Maryellen Quiros, PT)  Ambulation in the Community: Modified Independent (07/27/24 1005 : Maryellen Quiros, PT)    Current Function  Last Filed Values         Value Time User    Current Function  significantly below baseline level of function 8/27/2024  3:13 PM Franke, Michelle E, PT    Therapy Impairments  activity tolerance; balance; strength 8/27/2024  3:13 PM Franke, Michelle E, PT    ADLs Requiring Support  transfers; ambulation; stairs; bed mobility 8/27/2024  3:13 PM Franke, Michelle E, PT            Therapy Recommendations for Discharge:   PT:      Last Filed Values         Value Time User    PT Discharge Needs  therapy 5 or more times per week 8/27/2024  3:13 PM Franke, Michelle E, PT          OT:       Last Filed Values         Value Time User    OT Discharge Needs  therapy 1-3 times per week 8/27/2024 11:54 AM Bassam Shafer, OT          SLP:    Last Filed Values       None            Mobility Equipment Recommended for Discharge: owns 4ww, assess need for 2ww.      Barriers to Discharge  Identified Barriers to Discharge/Transition Planning: Medical necessity for acute care                   normal...

## 2024-09-23 ENCOUNTER — APPOINTMENT (OUTPATIENT)
Dept: OPHTHALMOLOGY | Facility: CLINIC | Age: 79
End: 2024-09-23
Payer: MEDICARE

## 2024-09-23 ENCOUNTER — NON-APPOINTMENT (OUTPATIENT)
Age: 79
End: 2024-09-23

## 2024-09-23 PROCEDURE — 92133 CPTRZD OPH DX IMG PST SGM ON: CPT

## 2024-09-23 PROCEDURE — 92012 INTRM OPH EXAM EST PATIENT: CPT

## 2024-09-26 ENCOUNTER — APPOINTMENT (OUTPATIENT)
Dept: OPHTHALMOLOGY | Facility: CLINIC | Age: 79
End: 2024-09-26

## 2024-10-23 ENCOUNTER — NON-APPOINTMENT (OUTPATIENT)
Age: 79
End: 2024-10-23

## 2024-10-23 ENCOUNTER — APPOINTMENT (OUTPATIENT)
Dept: DERMATOLOGY | Facility: CLINIC | Age: 79
End: 2024-10-23
Payer: MEDICARE

## 2024-10-23 VITALS — BODY MASS INDEX: 25.49 KG/M2 | HEIGHT: 61 IN | WEIGHT: 135 LBS

## 2024-10-23 DIAGNOSIS — D18.01 HEMANGIOMA OF SKIN AND SUBCUTANEOUS TISSUE: ICD-10-CM

## 2024-10-23 DIAGNOSIS — D48.5 NEOPLASM OF UNCERTAIN BEHAVIOR OF SKIN: ICD-10-CM

## 2024-10-23 DIAGNOSIS — Z12.83 ENCOUNTER FOR SCREENING FOR MALIGNANT NEOPLASM OF SKIN: ICD-10-CM

## 2024-10-23 DIAGNOSIS — L81.4 OTHER MELANIN HYPERPIGMENTATION: ICD-10-CM

## 2024-10-23 DIAGNOSIS — L82.1 OTHER SEBORRHEIC KERATOSIS: ICD-10-CM

## 2024-10-23 DIAGNOSIS — D48.9 NEOPLASM OF UNCERTAIN BEHAVIOR, UNSPECIFIED: ICD-10-CM

## 2024-10-23 DIAGNOSIS — L57.0 ACTINIC KERATOSIS: ICD-10-CM

## 2024-10-23 DIAGNOSIS — D22.9 MELANOCYTIC NEVI, UNSPECIFIED: ICD-10-CM

## 2024-10-23 PROCEDURE — 11104 PUNCH BX SKIN SINGLE LESION: CPT

## 2024-10-23 PROCEDURE — 17003 DESTRUCT PREMALG LES 2-14: CPT

## 2024-10-23 PROCEDURE — 17000 DESTRUCT PREMALG LESION: CPT | Mod: 59

## 2024-10-23 PROCEDURE — 11103 TANGNTL BX SKIN EA SEP/ADDL: CPT

## 2024-10-23 PROCEDURE — 99204 OFFICE O/P NEW MOD 45 MIN: CPT | Mod: 25

## 2024-10-23 RX ORDER — FLUOROURACIL 50 MG/G
5 CREAM TOPICAL
Qty: 2 | Refills: 4 | Status: ACTIVE | COMMUNITY
Start: 2024-10-23 | End: 1900-01-01

## 2024-10-31 ENCOUNTER — NON-APPOINTMENT (OUTPATIENT)
Age: 79
End: 2024-10-31

## 2024-10-31 LAB — DERMATOLOGY BIOPSY: NORMAL

## 2024-12-03 ENCOUNTER — APPOINTMENT (OUTPATIENT)
Dept: DERMATOLOGY | Facility: CLINIC | Age: 79
End: 2024-12-03
Payer: MEDICARE

## 2024-12-03 DIAGNOSIS — D09.9 CARCINOMA IN SITU, UNSPECIFIED: ICD-10-CM

## 2024-12-03 DIAGNOSIS — L57.0 ACTINIC KERATOSIS: ICD-10-CM

## 2024-12-03 PROCEDURE — 17273 DSTR MAL LES S/N/H/F/G 2.1-3: CPT | Mod: 59

## 2024-12-03 PROCEDURE — 17262 DSTRJ MAL LES T/A/L 1.1-2.0: CPT | Mod: 59

## 2024-12-03 PROCEDURE — 17272 DSTR MAL LES S/N/H/F/G 1.1-2: CPT | Mod: 59

## 2025-01-08 ENCOUNTER — APPOINTMENT (OUTPATIENT)
Dept: DERMATOLOGY | Facility: CLINIC | Age: 80
End: 2025-01-08
Payer: MEDICARE

## 2025-01-08 DIAGNOSIS — D22.9 MELANOCYTIC NEVI, UNSPECIFIED: ICD-10-CM

## 2025-01-08 DIAGNOSIS — D48.5 NEOPLASM OF UNCERTAIN BEHAVIOR OF SKIN: ICD-10-CM

## 2025-01-08 DIAGNOSIS — D48.9 NEOPLASM OF UNCERTAIN BEHAVIOR, UNSPECIFIED: ICD-10-CM

## 2025-01-08 DIAGNOSIS — Z12.83 ENCOUNTER FOR SCREENING FOR MALIGNANT NEOPLASM OF SKIN: ICD-10-CM

## 2025-01-08 DIAGNOSIS — D18.01 HEMANGIOMA OF SKIN AND SUBCUTANEOUS TISSUE: ICD-10-CM

## 2025-01-08 DIAGNOSIS — L82.1 OTHER SEBORRHEIC KERATOSIS: ICD-10-CM

## 2025-01-08 DIAGNOSIS — L57.0 ACTINIC KERATOSIS: ICD-10-CM

## 2025-01-08 DIAGNOSIS — L81.4 OTHER MELANIN HYPERPIGMENTATION: ICD-10-CM

## 2025-01-08 PROCEDURE — 11104 PUNCH BX SKIN SINGLE LESION: CPT | Mod: 59

## 2025-01-08 PROCEDURE — 11103 TANGNTL BX SKIN EA SEP/ADDL: CPT

## 2025-01-08 PROCEDURE — 99214 OFFICE O/P EST MOD 30 MIN: CPT | Mod: 25

## 2025-01-08 PROCEDURE — 17004 DESTROY PREMAL LESIONS 15/>: CPT

## 2025-01-21 DIAGNOSIS — C44.91 BASAL CELL CARCINOMA OF SKIN, UNSPECIFIED: ICD-10-CM

## 2025-01-22 ENCOUNTER — NON-APPOINTMENT (OUTPATIENT)
Age: 80
End: 2025-01-22

## 2025-01-30 ENCOUNTER — APPOINTMENT (OUTPATIENT)
Dept: OPHTHALMOLOGY | Facility: CLINIC | Age: 80
End: 2025-01-30
Payer: MEDICARE

## 2025-01-30 ENCOUNTER — NON-APPOINTMENT (OUTPATIENT)
Age: 80
End: 2025-01-30

## 2025-01-30 PROCEDURE — 92083 EXTENDED VISUAL FIELD XM: CPT

## 2025-01-30 PROCEDURE — 92012 INTRM OPH EXAM EST PATIENT: CPT

## 2025-02-20 ENCOUNTER — APPOINTMENT (OUTPATIENT)
Dept: OPHTHALMOLOGY | Facility: CLINIC | Age: 80
End: 2025-02-20
Payer: MEDICARE

## 2025-02-20 ENCOUNTER — NON-APPOINTMENT (OUTPATIENT)
Age: 80
End: 2025-02-20

## 2025-02-20 PROCEDURE — 92133 CPTRZD OPH DX IMG PST SGM ON: CPT

## 2025-02-20 PROCEDURE — 92012 INTRM OPH EXAM EST PATIENT: CPT

## 2025-03-28 ENCOUNTER — APPOINTMENT (OUTPATIENT)
Dept: OPHTHALMOLOGY | Facility: CLINIC | Age: 80
End: 2025-03-28

## 2025-04-22 ENCOUNTER — APPOINTMENT (OUTPATIENT)
Dept: OPHTHALMOLOGY | Facility: CLINIC | Age: 80
End: 2025-04-22
Payer: MEDICARE

## 2025-04-22 ENCOUNTER — NON-APPOINTMENT (OUTPATIENT)
Age: 80
End: 2025-04-22

## 2025-04-22 PROCEDURE — 99214 OFFICE O/P EST MOD 30 MIN: CPT

## 2025-05-08 ENCOUNTER — APPOINTMENT (OUTPATIENT)
Dept: DERMATOLOGY | Facility: CLINIC | Age: 80
End: 2025-05-08

## 2025-05-08 DIAGNOSIS — L57.0 ACTINIC KERATOSIS: ICD-10-CM

## 2025-05-08 DIAGNOSIS — Z12.83 ENCOUNTER FOR SCREENING FOR MALIGNANT NEOPLASM OF SKIN: ICD-10-CM

## 2025-05-08 DIAGNOSIS — D22.9 MELANOCYTIC NEVI, UNSPECIFIED: ICD-10-CM

## 2025-05-08 DIAGNOSIS — D18.01 HEMANGIOMA OF SKIN AND SUBCUTANEOUS TISSUE: ICD-10-CM

## 2025-05-08 DIAGNOSIS — L81.4 OTHER MELANIN HYPERPIGMENTATION: ICD-10-CM

## 2025-05-08 DIAGNOSIS — L82.1 OTHER SEBORRHEIC KERATOSIS: ICD-10-CM

## 2025-05-08 DIAGNOSIS — D48.9 NEOPLASM OF UNCERTAIN BEHAVIOR, UNSPECIFIED: ICD-10-CM

## 2025-05-08 DIAGNOSIS — Z79.899 OTHER LONG TERM (CURRENT) DRUG THERAPY: ICD-10-CM

## 2025-05-08 PROCEDURE — 99214 OFFICE O/P EST MOD 30 MIN: CPT | Mod: 25

## 2025-05-08 PROCEDURE — 11102 TANGNTL BX SKIN SINGLE LES: CPT | Mod: 59

## 2025-05-08 PROCEDURE — 17004 DESTROY PREMAL LESIONS 15/>: CPT

## 2025-05-08 RX ORDER — METFORMIN HYDROCHLORIDE 750 MG/1
TABLET ORAL
Refills: 0 | Status: ACTIVE | COMMUNITY

## 2025-05-13 LAB — DERMATOLOGY BIOPSY: NORMAL

## 2025-06-03 ENCOUNTER — APPOINTMENT (OUTPATIENT)
Dept: OPHTHALMOLOGY | Facility: CLINIC | Age: 80
End: 2025-06-03
Payer: MEDICARE

## 2025-06-03 ENCOUNTER — OUTPATIENT (OUTPATIENT)
Dept: OUTPATIENT SERVICES | Facility: HOSPITAL | Age: 80
LOS: 1 days | End: 2025-06-03

## 2025-06-03 ENCOUNTER — NON-APPOINTMENT (OUTPATIENT)
Age: 80
End: 2025-06-03

## 2025-06-03 PROCEDURE — 65855 TRABECULOPLASTY LASER SURG: CPT | Mod: LT

## 2025-06-06 DIAGNOSIS — H40.1123 PRIMARY OPEN-ANGLE GLAUCOMA, LEFT EYE, SEVERE STAGE: ICD-10-CM

## 2025-07-24 ENCOUNTER — APPOINTMENT (OUTPATIENT)
Dept: OPHTHALMOLOGY | Facility: CLINIC | Age: 80
End: 2025-07-24
Payer: MEDICARE

## 2025-07-24 ENCOUNTER — NON-APPOINTMENT (OUTPATIENT)
Age: 80
End: 2025-07-24

## 2025-07-24 PROCEDURE — 99214 OFFICE O/P EST MOD 30 MIN: CPT

## 2025-08-25 ENCOUNTER — APPOINTMENT (OUTPATIENT)
Dept: OPHTHALMOLOGY | Facility: CLINIC | Age: 80
End: 2025-08-25
Payer: MEDICARE

## 2025-08-25 ENCOUNTER — NON-APPOINTMENT (OUTPATIENT)
Age: 80
End: 2025-08-25

## 2025-08-25 PROCEDURE — 92012 INTRM OPH EXAM EST PATIENT: CPT

## 2025-09-08 ENCOUNTER — NON-APPOINTMENT (OUTPATIENT)
Age: 80
End: 2025-09-08

## 2025-09-09 ENCOUNTER — APPOINTMENT (OUTPATIENT)
Dept: DERMATOLOGY | Facility: CLINIC | Age: 80
End: 2025-09-09
Payer: MEDICARE

## 2025-09-09 DIAGNOSIS — D18.01 HEMANGIOMA OF SKIN AND SUBCUTANEOUS TISSUE: ICD-10-CM

## 2025-09-09 DIAGNOSIS — L82.1 OTHER SEBORRHEIC KERATOSIS: ICD-10-CM

## 2025-09-09 DIAGNOSIS — D22.9 MELANOCYTIC NEVI, UNSPECIFIED: ICD-10-CM

## 2025-09-09 DIAGNOSIS — L57.0 ACTINIC KERATOSIS: ICD-10-CM

## 2025-09-09 DIAGNOSIS — B37.2 CANDIDIASIS OF SKIN AND NAIL: ICD-10-CM

## 2025-09-09 DIAGNOSIS — L81.4 OTHER MELANIN HYPERPIGMENTATION: ICD-10-CM

## 2025-09-09 DIAGNOSIS — L85.3 XEROSIS CUTIS: ICD-10-CM

## 2025-09-09 DIAGNOSIS — Z12.83 ENCOUNTER FOR SCREENING FOR MALIGNANT NEOPLASM OF SKIN: ICD-10-CM

## 2025-09-09 PROCEDURE — 17004 DESTROY PREMAL LESIONS 15/>: CPT

## 2025-09-09 PROCEDURE — 99214 OFFICE O/P EST MOD 30 MIN: CPT | Mod: 25

## 2025-09-09 RX ORDER — AMMONIUM LACTATE 12 %
12 CREAM (GRAM) TOPICAL
Qty: 1 | Refills: 3 | Status: ACTIVE | COMMUNITY
Start: 2025-09-09 | End: 1900-01-01

## 2025-09-09 RX ORDER — KETOCONAZOLE 20 MG/G
2 CREAM TOPICAL
Qty: 1 | Refills: 2 | Status: ACTIVE | COMMUNITY
Start: 2025-09-09 | End: 1900-01-01

## 2025-09-09 RX ORDER — THIAMINE HCL 100 MG
500 CAPSULE ORAL
Qty: 60 | Refills: 11 | Status: ACTIVE | COMMUNITY
Start: 2025-09-09 | End: 1900-01-01